# Patient Record
Sex: MALE | Race: WHITE | Employment: FULL TIME
[De-identification: names, ages, dates, MRNs, and addresses within clinical notes are randomized per-mention and may not be internally consistent; named-entity substitution may affect disease eponyms.]

---

## 2017-03-07 ENCOUNTER — OFFICE VISIT (OUTPATIENT)
Dept: FAMILY MEDICINE CLINIC | Facility: CLINIC | Age: 52
End: 2017-03-07

## 2017-03-07 ENCOUNTER — HOSPITAL ENCOUNTER (OUTPATIENT)
Age: 52
Setting detail: SPECIMEN
Discharge: HOME | End: 2017-03-07
Payer: COMMERCIAL

## 2017-03-07 VITALS
WEIGHT: 136 LBS | DIASTOLIC BLOOD PRESSURE: 88 MMHG | SYSTOLIC BLOOD PRESSURE: 140 MMHG | HEIGHT: 70 IN | HEART RATE: 68 BPM | BODY MASS INDEX: 19.47 KG/M2 | OXYGEN SATURATION: 98 %

## 2017-03-07 DIAGNOSIS — I73.9 PAD (PERIPHERAL ARTERY DISEASE) (HCC): ICD-10-CM

## 2017-03-07 DIAGNOSIS — Z12.5 SCREENING PSA (PROSTATE SPECIFIC ANTIGEN): ICD-10-CM

## 2017-03-07 DIAGNOSIS — Z51.81 MEDICATION MONITORING ENCOUNTER: ICD-10-CM

## 2017-03-07 DIAGNOSIS — Z12.5 SCREENING FOR PROSTATE CANCER: ICD-10-CM

## 2017-03-07 DIAGNOSIS — N52.9 ERECTILE DYSFUNCTION, UNSPECIFIED ERECTILE DYSFUNCTION TYPE: ICD-10-CM

## 2017-03-07 DIAGNOSIS — Z13.220 SCREENING FOR LIPID DISORDERS: ICD-10-CM

## 2017-03-07 DIAGNOSIS — Z12.11 COLON CANCER SCREENING: Primary | ICD-10-CM

## 2017-03-07 LAB
ALBUMIN SERPL-MCNC: 4.2 G/DL (ref 3.5–5.2)
ALBUMIN/GLOBULIN RATIO: 1.2 (ref 1–2.5)
ALP BLD-CCNC: 67 U/L (ref 40–129)
ALT SERPL-CCNC: 29 U/L (ref 5–41)
ANION GAP SERPL CALCULATED.3IONS-SCNC: 18 MMOL/L (ref 9–17)
AST SERPL-CCNC: 45 U/L
BILIRUB SERPL-MCNC: 0.5 MG/DL (ref 0.3–1.2)
BUN BLDV-MCNC: 4 MG/DL (ref 6–20)
BUN/CREAT BLD: ABNORMAL (ref 9–20)
CALCIUM SERPL-MCNC: 9.2 MG/DL (ref 8.6–10.4)
CHLORIDE BLD-SCNC: 99 MMOL/L (ref 98–107)
CHOLESTEROL/HDL RATIO: 2.9
CHOLESTEROL: 239 MG/DL
CO2: 23 MMOL/L (ref 20–31)
CREAT SERPL-MCNC: 0.68 MG/DL (ref 0.7–1.2)
GFR AFRICAN AMERICAN: >60 ML/MIN
GFR NON-AFRICAN AMERICAN: >60 ML/MIN
GFR SERPL CREATININE-BSD FRML MDRD: ABNORMAL ML/MIN/{1.73_M2}
GFR SERPL CREATININE-BSD FRML MDRD: ABNORMAL ML/MIN/{1.73_M2}
GLUCOSE BLD-MCNC: 75 MG/DL (ref 70–99)
HDLC SERPL-MCNC: 82 MG/DL
LDL CHOLESTEROL: 131 MG/DL (ref 0–130)
POTASSIUM SERPL-SCNC: 4.5 MMOL/L (ref 3.7–5.3)
PROSTATE SPECIFIC ANTIGEN: 0.27 UG/L
SODIUM BLD-SCNC: 140 MMOL/L (ref 135–144)
TOTAL PROTEIN: 7.7 G/DL (ref 6.4–8.3)
TRIGL SERPL-MCNC: 131 MG/DL
VLDLC SERPL CALC-MCNC: ABNORMAL MG/DL (ref 1–30)

## 2017-03-07 PROCEDURE — 99203 OFFICE O/P NEW LOW 30 MIN: CPT | Performed by: FAMILY MEDICINE

## 2017-03-07 RX ORDER — CILOSTAZOL 100 MG/1
100 TABLET ORAL 2 TIMES DAILY
Qty: 180 TABLET | Refills: 1 | Status: SHIPPED | OUTPATIENT
Start: 2017-03-07 | End: 2017-10-09 | Stop reason: SDUPTHER

## 2017-03-07 RX ORDER — TADALAFIL 20 MG/1
10-20 TABLET ORAL PRN
Qty: 10 TABLET | Refills: 3 | Status: SHIPPED | OUTPATIENT
Start: 2017-03-07 | End: 2017-11-02 | Stop reason: SDUPTHER

## 2017-03-07 ASSESSMENT — ENCOUNTER SYMPTOMS
EYE PAIN: 0
PHOTOPHOBIA: 0
SORE THROAT: 0
CHEST TIGHTNESS: 0
SHORTNESS OF BREATH: 0
CHOKING: 0
APNEA: 0
COLOR CHANGE: 0
BLOOD IN STOOL: 0
COUGH: 0
EYE DISCHARGE: 0
ABDOMINAL PAIN: 0
STRIDOR: 0
WHEEZING: 0
SINUS PRESSURE: 0
RHINORRHEA: 0
EYE ITCHING: 0
CONSTIPATION: 0
BACK PAIN: 0
NAUSEA: 0
EYE REDNESS: 0
ABDOMINAL DISTENTION: 0
VOMITING: 0
DIARRHEA: 0

## 2017-03-07 ASSESSMENT — PATIENT HEALTH QUESTIONNAIRE - PHQ9
1. LITTLE INTEREST OR PLEASURE IN DOING THINGS: 0
SUM OF ALL RESPONSES TO PHQ QUESTIONS 1-9: 0
2. FEELING DOWN, DEPRESSED OR HOPELESS: 0
SUM OF ALL RESPONSES TO PHQ9 QUESTIONS 1 & 2: 0

## 2017-03-13 ENCOUNTER — TELEPHONE (OUTPATIENT)
Dept: FAMILY MEDICINE CLINIC | Age: 52
End: 2017-03-13

## 2017-03-22 ENCOUNTER — HOSPITAL ENCOUNTER (OUTPATIENT)
Dept: VASCULAR LAB | Age: 52
Discharge: HOME | End: 2017-03-22
Payer: COMMERCIAL

## 2017-03-22 DIAGNOSIS — I73.9 CLAUDICATION (HCC): ICD-10-CM

## 2017-03-22 DIAGNOSIS — I73.9 PAD (PERIPHERAL ARTERY DISEASE) (HCC): Primary | ICD-10-CM

## 2017-03-22 PROCEDURE — 93924 LWR XTR VASC STDY BILAT: CPT

## 2017-03-23 DIAGNOSIS — I73.9 PAD (PERIPHERAL ARTERY DISEASE) (HCC): Primary | ICD-10-CM

## 2017-05-31 ENCOUNTER — TELEPHONE (OUTPATIENT)
Dept: FAMILY MEDICINE CLINIC | Age: 52
End: 2017-05-31

## 2017-05-31 DIAGNOSIS — Z51.81 MEDICATION MONITORING ENCOUNTER: Primary | ICD-10-CM

## 2017-05-31 RX ORDER — ATORVASTATIN CALCIUM 40 MG/1
40 TABLET, FILM COATED ORAL DAILY
Qty: 30 TABLET | Refills: 5 | Status: SHIPPED | OUTPATIENT
Start: 2017-05-31 | End: 2017-11-02 | Stop reason: SDUPTHER

## 2017-06-16 ENCOUNTER — HOSPITAL ENCOUNTER (OUTPATIENT)
Age: 52
Setting detail: SPECIMEN
Discharge: HOME | End: 2017-06-16
Payer: COMMERCIAL

## 2017-06-16 DIAGNOSIS — Z51.81 MEDICATION MONITORING ENCOUNTER: ICD-10-CM

## 2017-06-16 LAB
ALBUMIN SERPL-MCNC: 4 G/DL (ref 3.5–5.2)
ALBUMIN/GLOBULIN RATIO: 1.1 (ref 1–2.5)
ALP BLD-CCNC: 65 U/L (ref 40–129)
ALT SERPL-CCNC: 15 U/L (ref 5–41)
ANION GAP SERPL CALCULATED.3IONS-SCNC: 18 MMOL/L (ref 9–17)
AST SERPL-CCNC: 20 U/L
BILIRUB SERPL-MCNC: 0.72 MG/DL (ref 0.3–1.2)
BUN BLDV-MCNC: 4 MG/DL (ref 6–20)
BUN/CREAT BLD: ABNORMAL (ref 9–20)
CALCIUM SERPL-MCNC: 8.9 MG/DL (ref 8.6–10.4)
CHLORIDE BLD-SCNC: 98 MMOL/L (ref 98–107)
CO2: 21 MMOL/L (ref 20–31)
CREAT SERPL-MCNC: 0.72 MG/DL (ref 0.7–1.2)
GFR AFRICAN AMERICAN: >60 ML/MIN
GFR NON-AFRICAN AMERICAN: >60 ML/MIN
GFR SERPL CREATININE-BSD FRML MDRD: ABNORMAL ML/MIN/{1.73_M2}
GFR SERPL CREATININE-BSD FRML MDRD: ABNORMAL ML/MIN/{1.73_M2}
GLUCOSE BLD-MCNC: 81 MG/DL (ref 70–99)
POTASSIUM SERPL-SCNC: 4.6 MMOL/L (ref 3.7–5.3)
SODIUM BLD-SCNC: 137 MMOL/L (ref 135–144)
TOTAL PROTEIN: 7.7 G/DL (ref 6.4–8.3)

## 2017-09-07 ENCOUNTER — OFFICE VISIT (OUTPATIENT)
Dept: FAMILY MEDICINE CLINIC | Age: 52
End: 2017-09-07
Payer: COMMERCIAL

## 2017-09-07 VITALS
DIASTOLIC BLOOD PRESSURE: 80 MMHG | HEIGHT: 70 IN | WEIGHT: 145.4 LBS | SYSTOLIC BLOOD PRESSURE: 160 MMHG | BODY MASS INDEX: 20.81 KG/M2 | HEART RATE: 100 BPM | OXYGEN SATURATION: 97 %

## 2017-09-07 DIAGNOSIS — M54.16 LUMBAR RADICULOPATHY: Primary | ICD-10-CM

## 2017-09-07 PROCEDURE — 99213 OFFICE O/P EST LOW 20 MIN: CPT | Performed by: FAMILY MEDICINE

## 2017-09-07 RX ORDER — PREDNISONE 20 MG/1
TABLET ORAL
Qty: 34 TABLET | Refills: 0 | Status: SHIPPED | OUTPATIENT
Start: 2017-09-07 | End: 2017-10-04 | Stop reason: ALTCHOICE

## 2017-09-07 ASSESSMENT — ENCOUNTER SYMPTOMS
EYE ITCHING: 0
CHEST TIGHTNESS: 0
BLOOD IN STOOL: 0
DIARRHEA: 0
RHINORRHEA: 0
EYE REDNESS: 0
NAUSEA: 0
SORE THROAT: 0
ABDOMINAL PAIN: 0
COUGH: 0
EYE PAIN: 0
VOMITING: 0
EYE DISCHARGE: 0
PHOTOPHOBIA: 0
ABDOMINAL DISTENTION: 0
STRIDOR: 0
WHEEZING: 0
SHORTNESS OF BREATH: 0
COLOR CHANGE: 0
APNEA: 0
SINUS PRESSURE: 0
BACK PAIN: 0
CHOKING: 0
CONSTIPATION: 0

## 2017-09-21 ENCOUNTER — TELEPHONE (OUTPATIENT)
Dept: FAMILY MEDICINE CLINIC | Age: 52
End: 2017-09-21

## 2017-09-21 ENCOUNTER — NURSE ONLY (OUTPATIENT)
Dept: FAMILY MEDICINE CLINIC | Age: 52
End: 2017-09-21

## 2017-09-21 VITALS — DIASTOLIC BLOOD PRESSURE: 86 MMHG | SYSTOLIC BLOOD PRESSURE: 160 MMHG

## 2017-09-21 DIAGNOSIS — R03.0 ELEVATED BLOOD PRESSURE READING: Primary | ICD-10-CM

## 2017-09-21 RX ORDER — HYDROCHLOROTHIAZIDE 25 MG/1
25 TABLET ORAL DAILY
Qty: 30 TABLET | Refills: 5 | Status: SHIPPED | OUTPATIENT
Start: 2017-09-21 | End: 2017-11-02 | Stop reason: SDUPTHER

## 2017-10-04 ENCOUNTER — OFFICE VISIT (OUTPATIENT)
Dept: FAMILY MEDICINE CLINIC | Age: 52
End: 2017-10-04
Payer: COMMERCIAL

## 2017-10-04 VITALS
WEIGHT: 145 LBS | SYSTOLIC BLOOD PRESSURE: 138 MMHG | DIASTOLIC BLOOD PRESSURE: 82 MMHG | BODY MASS INDEX: 20.81 KG/M2 | HEART RATE: 80 BPM

## 2017-10-04 DIAGNOSIS — I73.9 CLAUDICATION (HCC): Primary | ICD-10-CM

## 2017-10-04 DIAGNOSIS — I10 ESSENTIAL HYPERTENSION: ICD-10-CM

## 2017-10-04 PROCEDURE — 99213 OFFICE O/P EST LOW 20 MIN: CPT | Performed by: FAMILY MEDICINE

## 2017-10-04 ASSESSMENT — ENCOUNTER SYMPTOMS
EYE ITCHING: 0
VOMITING: 0
EYE PAIN: 0
NAUSEA: 0
ABDOMINAL DISTENTION: 0
SHORTNESS OF BREATH: 0
STRIDOR: 0
RHINORRHEA: 0
APNEA: 0
CHEST TIGHTNESS: 0
DIARRHEA: 0
SORE THROAT: 0
COLOR CHANGE: 0
ABDOMINAL PAIN: 0
WHEEZING: 0
SINUS PRESSURE: 0
EYE DISCHARGE: 0
COUGH: 0
CONSTIPATION: 0
BLOOD IN STOOL: 0
EYE REDNESS: 0
BACK PAIN: 0
PHOTOPHOBIA: 0
CHOKING: 0

## 2017-10-04 NOTE — MR AVS SNAPSHOT
Additional Information        Basic Information     Date Of Birth Sex Race Ethnicity Preferred Language Preferred Written Language    1965 Male White Non-/Non  English English      Preventive Care        Date Due    Tetanus Combination Vaccine (1 - Tdap) 4/30/1984    Pneumococcal Vaccine - Pneumovax for adults aged 19-64 years with: chronic heart disease, chronic lung disease, diabetes mellitus, alcoholism, chronic liver disease, or cigarette smoking. (1 of 1 - PPSV23) 4/30/1984    Colonoscopy 4/30/2015    Yearly Flu Vaccine (1) 9/1/2017    Hepatitis C screening is recommended for all adults regardless of risk factors born between [facility] at least once (lifetime) who have never been tested. 10/1/2018 (Originally 1965)    HIV screening is recommended for all people regardless of risk factors  aged 15-65 years at least once (lifetime) who have never been HIV tested. 10/1/2018 (Originally 4/30/1980)    Cholesterol Screening 3/7/2022            [facility] Signup           Our records indicate that you have an active eyetok account. You can view your After Visit Summary by going to [email or web address] and logging in with your eyetok username and password. If you don't have a eyetok username and password but a parent or guardian has access to your record, the parent or guardian should login with their own eyetok username and password and access your record to view the After Visit Summary. Additional Information  If you have questions, please contact the physician practice where you receive care. Remember, eyetok is NOT to be used for urgent needs. For medical emergencies, dial 911. For questions regarding your eyetok account call [phone]. If you have a clinical question, please call your doctor's office.

## 2017-10-04 NOTE — PROGRESS NOTES
walking. I would like to trial him in a light compression stocking and see if we can increase his venous return. 2. Essential hypertension  Slightly higher than goal but no additional adjustments seem to be needed.

## 2017-10-10 RX ORDER — CILOSTAZOL 100 MG/1
TABLET ORAL
Qty: 180 TABLET | Refills: 1 | Status: SHIPPED | OUTPATIENT
Start: 2017-10-10 | End: 2017-11-02 | Stop reason: SDUPTHER

## 2017-10-10 NOTE — TELEPHONE ENCOUNTER
Health Maintenance   Topic Date Due    DTaP/Tdap/Td vaccine (1 - Tdap) 04/30/1984    Pneumococcal med risk (1 of 1 - PPSV23) 04/30/1984    Colon cancer screen colonoscopy  04/30/2015    Flu vaccine (1) 09/01/2017    Hepatitis C screen  10/01/2018 (Originally 1965)    HIV screen  10/01/2018 (Originally 4/30/1980)    Lipid screen  03/07/2022       No results found for: LABA1C          ( goal A1C is < 7)   No results found for: LABMICR  LDL Cholesterol (mg/dL)   Date Value   03/07/2017 131 (H)       (goal LDL is <100)   AST (U/L)   Date Value   06/16/2017 20     ALT (U/L)   Date Value   06/16/2017 15     BUN (mg/dL)   Date Value   06/16/2017 4 (L)     BP Readings from Last 3 Encounters:   10/04/17 138/82   09/21/17 (!) 160/86   09/07/17 (!) 160/80          (goal 120/80)    All Future Testing planned in [facility]      Next Visit Date:  No future appointments. There is no problem list on file for this patient.

## 2017-11-02 DIAGNOSIS — Z51.81 MEDICATION MONITORING ENCOUNTER: ICD-10-CM

## 2017-11-02 RX ORDER — ATORVASTATIN CALCIUM 40 MG/1
40 TABLET, FILM COATED ORAL DAILY
Qty: 90 TABLET | Refills: 3 | Status: SHIPPED | OUTPATIENT
Start: 2017-11-02 | End: 2018-11-13 | Stop reason: SDUPTHER

## 2017-11-02 RX ORDER — TADALAFIL 20 MG/1
10-20 TABLET ORAL PRN
Qty: 10 TABLET | Refills: 3 | Status: SHIPPED | OUTPATIENT
Start: 2017-11-02 | End: 2018-08-07 | Stop reason: ALTCHOICE

## 2017-11-02 RX ORDER — HYDROCHLOROTHIAZIDE 25 MG/1
25 TABLET ORAL DAILY
Qty: 90 TABLET | Refills: 3 | Status: SHIPPED | OUTPATIENT
Start: 2017-11-02 | End: 2018-11-13 | Stop reason: SDUPTHER

## 2017-11-02 RX ORDER — CILOSTAZOL 100 MG/1
TABLET ORAL
Qty: 180 TABLET | Refills: 3 | Status: SHIPPED | OUTPATIENT
Start: 2017-11-02 | End: 2018-08-07 | Stop reason: ALTCHOICE

## 2018-08-07 ENCOUNTER — OFFICE VISIT (OUTPATIENT)
Dept: FAMILY MEDICINE CLINIC | Age: 53
End: 2018-08-07
Payer: COMMERCIAL

## 2018-08-07 VITALS
RESPIRATION RATE: 16 BRPM | OXYGEN SATURATION: 97 % | BODY MASS INDEX: 20.04 KG/M2 | SYSTOLIC BLOOD PRESSURE: 128 MMHG | WEIGHT: 140 LBS | TEMPERATURE: 100.2 F | HEART RATE: 101 BPM | DIASTOLIC BLOOD PRESSURE: 70 MMHG | HEIGHT: 70 IN

## 2018-08-07 DIAGNOSIS — J01.90 ACUTE BACTERIAL SINUSITIS: Primary | ICD-10-CM

## 2018-08-07 DIAGNOSIS — B96.89 ACUTE BACTERIAL SINUSITIS: Primary | ICD-10-CM

## 2018-08-07 PROCEDURE — 99213 OFFICE O/P EST LOW 20 MIN: CPT | Performed by: NURSE PRACTITIONER

## 2018-08-07 RX ORDER — AMOXICILLIN AND CLAVULANATE POTASSIUM 875; 125 MG/1; MG/1
1 TABLET, FILM COATED ORAL 2 TIMES DAILY
Qty: 20 TABLET | Refills: 0 | Status: SHIPPED | OUTPATIENT
Start: 2018-08-07 | End: 2018-08-17

## 2018-08-07 ASSESSMENT — PATIENT HEALTH QUESTIONNAIRE - PHQ9
2. FEELING DOWN, DEPRESSED OR HOPELESS: 0
1. LITTLE INTEREST OR PLEASURE IN DOING THINGS: 0
SUM OF ALL RESPONSES TO PHQ QUESTIONS 1-9: 0
SUM OF ALL RESPONSES TO PHQ9 QUESTIONS 1 & 2: 0

## 2018-08-07 ASSESSMENT — ENCOUNTER SYMPTOMS
DIARRHEA: 0
TROUBLE SWALLOWING: 0
SORE THROAT: 0
COLOR CHANGE: 0
SINUS PAIN: 1
SHORTNESS OF BREATH: 0
GASTROINTESTINAL NEGATIVE: 1
EYES NEGATIVE: 1
SINUS PRESSURE: 1
VOMITING: 0
COUGH: 1
WHEEZING: 0

## 2018-08-07 NOTE — PROGRESS NOTES
His speech is normal and behavior is normal. Judgment and thought content normal. Cognition and memory are normal.         Assessment:         1. Acute bacterial sinusitis        Plan:     1. Acute bacterial sinusitis    - amoxicillin-clavulanate (AUGMENTIN) 875-125 MG per tablet; Take 1 tablet by mouth 2 times daily for 10 days  Dispense: 20 tablet; Refill: 0    Antibiotic as prescribed  Tylenol/Advil for fever/pain  Mucinex PRN  Call if worsened or questions/concerns     Discussed use, benefit, and side effects of prescribed medications. All patient questions answered. Pt voiced understanding. Reviewed health maintenance. Instructed to continue current medications, diet and exercise. Patient agreed with treatment plan. Follow up as directed.      Electronically signed by MARC Castano CNP on 8/7/2018

## 2018-08-07 NOTE — PATIENT INSTRUCTIONS
Acute rhinosinusitis (ARS) is defined as symptomatic inflammation of the nasal cavity and paranasal sinuses lasting less than four weeks. The most common etiology of ARS is a viral infection. Treatment for acute viral rhinosinusitis (AVRS) focuses on symptomatic management as it typically resolves within 7 to 10 days. Bacterial infection occurs in only 0.5 to 2 percent of episodes of ARS. Acute bacterial rhinosinusitis (ABRS) may also be a self-limited disease. Patients may be treated symptomatically and observed or treated with antibiotics. ACUTE VIRAL RHINOSINUSITIS -- Patients with acute viral rhinosinusitis (AVRS) should be managed with supportive care. There are no treatments to shorten the clinical course of the disease. Natural history -- AVRS may not completely resolve within 10 days but is expected to improve. Patients who fail to improve after ?10 days of symptomatic management are more likely to have acute bacterial rhinosinusitis (ABRS) and should be managed as ABRS patients.     Treatment includes - Analgesics and antipyretics (tylenol, motrin)  Saline irrigation   Intranasal glucocorticoids (most beneficial for those w/ allergies)  Oral decongestants   Intranasal decongestants   Antihistamines  Mucolytics (mucinex)

## 2018-08-20 ENCOUNTER — TELEPHONE (OUTPATIENT)
Dept: FAMILY MEDICINE CLINIC | Age: 53
End: 2018-08-20

## 2018-08-20 RX ORDER — DOXYCYCLINE 100 MG/1
100 CAPSULE ORAL 2 TIMES DAILY WITH MEALS
Qty: 20 CAPSULE | Refills: 0 | Status: SHIPPED | OUTPATIENT
Start: 2018-08-20 | End: 2018-08-30

## 2018-08-20 NOTE — TELEPHONE ENCOUNTER
Acute respiratory issue     Patient complains of itchy eyes, head congestion, facial pressure  Symptoms for how long  Since last visit here  What meds has pt tried  amoxicillen  Does patient have asthma no  Is patient on inhalers no  Other symptoms include  See above  Is this sinus, cold or cough related  Patient says sinus    *This condition is eligible for an eVisit. If not already active, sign patient up for MyChart to improve access and communication with the provider. *

## 2018-08-20 NOTE — TELEPHONE ENCOUNTER
Pt called in and stated he is finished with his medication but is not any better. Would like to know if something else can be called in or if he needs to be seen.

## 2018-08-20 NOTE — TELEPHONE ENCOUNTER
Are symptoms getting worse better or staying the same? Any facial swelling or fevers still?   I have sent in Doxy to start but please note above when calling back patient

## 2018-08-21 NOTE — TELEPHONE ENCOUNTER
Patient advised. He said the light-headedness is better, but not the headache, or sinus pressure. He does not notice any facial swelling and he has not taken his temp but he doesn't feel like he has a fever. He is at work right now.

## 2018-08-30 ENCOUNTER — TELEPHONE (OUTPATIENT)
Dept: FAMILY MEDICINE CLINIC | Age: 53
End: 2018-08-30

## 2018-08-30 RX ORDER — PREDNISONE 20 MG/1
TABLET ORAL
Qty: 18 TABLET | Refills: 0 | Status: SHIPPED | OUTPATIENT
Start: 2018-08-30 | End: 2018-09-09

## 2018-08-30 NOTE — TELEPHONE ENCOUNTER
Patient was supposed to call and let you know how he is doing. He says nothing has changed. He has  A real bad headache, nause, head congestion,eyes hurt,and scratchy throat. What do you advise?

## 2018-09-12 ENCOUNTER — OFFICE VISIT (OUTPATIENT)
Dept: FAMILY MEDICINE CLINIC | Age: 53
End: 2018-09-12
Payer: COMMERCIAL

## 2018-09-12 VITALS
OXYGEN SATURATION: 95 % | BODY MASS INDEX: 21.06 KG/M2 | WEIGHT: 142.2 LBS | DIASTOLIC BLOOD PRESSURE: 80 MMHG | HEART RATE: 86 BPM | SYSTOLIC BLOOD PRESSURE: 140 MMHG | HEIGHT: 69 IN

## 2018-09-12 DIAGNOSIS — H10.13 ALLERGIC CONJUNCTIVITIS OF BOTH EYES: Primary | ICD-10-CM

## 2018-09-12 DIAGNOSIS — B35.6 JOCK ITCH: ICD-10-CM

## 2018-09-12 PROCEDURE — 99213 OFFICE O/P EST LOW 20 MIN: CPT | Performed by: FAMILY MEDICINE

## 2018-09-12 PROCEDURE — 96372 THER/PROPH/DIAG INJ SC/IM: CPT | Performed by: FAMILY MEDICINE

## 2018-09-12 RX ORDER — OLOPATADINE HYDROCHLORIDE 2 MG/ML
1 SOLUTION/ DROPS OPHTHALMIC DAILY
Qty: 1 BOTTLE | Refills: 0 | Status: SHIPPED | OUTPATIENT
Start: 2018-09-12 | End: 2021-05-03

## 2018-09-12 RX ORDER — TRIAMCINOLONE ACETONIDE 40 MG/ML
120 INJECTION, SUSPENSION INTRA-ARTICULAR; INTRAMUSCULAR ONCE
Status: COMPLETED | OUTPATIENT
Start: 2018-09-12 | End: 2018-09-12

## 2018-09-12 RX ORDER — TERBINAFINE HYDROCHLORIDE 250 MG/1
250 TABLET ORAL DAILY
Qty: 30 TABLET | Refills: 0 | Status: SHIPPED | OUTPATIENT
Start: 2018-09-12 | End: 2018-10-12

## 2018-09-12 RX ADMIN — TRIAMCINOLONE ACETONIDE 120 MG: 40 INJECTION, SUSPENSION INTRA-ARTICULAR; INTRAMUSCULAR at 11:22

## 2018-09-12 ASSESSMENT — ENCOUNTER SYMPTOMS
ABDOMINAL DISTENTION: 0
STRIDOR: 0
SHORTNESS OF BREATH: 0
PHOTOPHOBIA: 0
EYE DISCHARGE: 1
EYE ITCHING: 1
ABDOMINAL PAIN: 0
VOMITING: 0
COLOR CHANGE: 0
NAUSEA: 0
WHEEZING: 0
EYE PAIN: 0
APNEA: 0
CHOKING: 0
RHINORRHEA: 0
CHEST TIGHTNESS: 0
CONSTIPATION: 0
BACK PAIN: 0
SINUS PRESSURE: 1
COUGH: 1
DIARRHEA: 0
SORE THROAT: 0
EYE REDNESS: 0
BLOOD IN STOOL: 0

## 2018-09-12 NOTE — PROGRESS NOTES
Subjective:      Patient ID: Tao Cardneas is a 48 y.o. male. HPI  Has been having about a month of congestion and has been having some trouble with balance and vision for the same period. Vision seems to be more a distance problem with some blurring but reading up close is unaffected and there is no double vision or floater. Eye glasses were updated only about a month and a half ago and prior to being ill he had no difficulty with his vision. No headaches recently but had been when this started. He has been having some matting and crusting on the eyes in the mornings. Review of Systems   Constitutional: Negative for activity change, appetite change, chills, diaphoresis, fatigue, fever and unexpected weight change. HENT: Positive for congestion, postnasal drip and sinus pressure. Negative for dental problem, ear pain, hearing loss, mouth sores, nosebleeds, rhinorrhea and sore throat. Eyes: Positive for discharge, itching and visual disturbance. Negative for photophobia, pain and redness. Respiratory: Positive for cough. Negative for apnea, choking, chest tightness, shortness of breath, wheezing and stridor. Cardiovascular: Negative for chest pain, palpitations and leg swelling. Gastrointestinal: Negative for abdominal distention, abdominal pain, blood in stool, constipation, diarrhea, nausea and vomiting. Genitourinary: Negative for decreased urine volume, difficulty urinating, dysuria, flank pain, frequency, scrotal swelling, testicular pain and urgency. Musculoskeletal: Negative for back pain, gait problem, joint swelling, myalgias, neck pain and neck stiffness. Skin: Positive for rash. Negative for color change and pallor. Neurological: Negative for dizziness, tremors, seizures, syncope, facial asymmetry, speech difficulty, weakness, light-headedness, numbness and headaches.    Psychiatric/Behavioral: Negative for agitation, behavioral problems, decreased concentration, sleep disturbance and suicidal ideas. The patient is not nervous/anxious. Objective:   Physical Exam   Constitutional: He appears well-developed and well-nourished. HENT:   Head: Normocephalic. Right Ear: Tympanic membrane is not erythematous and not bulging. Left Ear: Tympanic membrane is not erythematous and not bulging. Nose: Mucosal edema and rhinorrhea present. Mouth/Throat: Uvula is midline and mucous membranes are normal. Oropharyngeal exudate and posterior oropharyngeal edema present. No posterior oropharyngeal erythema. Eyes: Pupils are equal, round, and reactive to light. EOM are normal. Right conjunctiva is injected. Left conjunctiva is injected. Neck: Trachea normal, normal range of motion and full passive range of motion without pain. Neck supple. No JVD present. Carotid bruit is not present. Cardiovascular: Normal rate, regular rhythm, S1 normal, S2 normal, normal heart sounds and normal pulses. Exam reveals no gallop, no S3, no S4, no distant heart sounds and no friction rub. No murmur heard. No systolic murmur is present   Pulmonary/Chest: Effort normal and breath sounds normal.   Abdominal: Normal appearance and bowel sounds are normal. There is no tenderness. Lymphadenopathy:     He has no cervical adenopathy. Right cervical: No superficial cervical and no deep cervical adenopathy present. Left cervical: No superficial cervical and no deep cervical adenopathy present. Neurological: He is alert. He has normal strength. No cranial nerve deficit or sensory deficit. He displays a negative Romberg sign. Reflex Scores:       Brachioradialis reflexes are 2+ on the right side and 2+ on the left side. Patellar reflexes are 2+ on the right side and 2+ on the left side. Achilles reflexes are 2+ on the right side and 2+ on the left side. Skin: Skin is warm, dry and intact. He is not diaphoretic. No pallor.         Psychiatric: He has a normal mood and affect. His speech is normal and behavior is normal. Judgment and thought content normal. Cognition and memory are normal.       Assessment / Plan:   1. Allergic conjunctivitis of both eyes  Kenalog injection given followed with pataday eye drops. 2. Jock itch  Will treat both orally and topically because of the severity of infection. Will have him then transition to Zeasorb AF powder once cleared.

## 2018-10-25 ENCOUNTER — OFFICE VISIT (OUTPATIENT)
Dept: FAMILY MEDICINE CLINIC | Age: 53
End: 2018-10-25
Payer: COMMERCIAL

## 2018-10-25 VITALS
HEART RATE: 98 BPM | SYSTOLIC BLOOD PRESSURE: 138 MMHG | OXYGEN SATURATION: 96 % | DIASTOLIC BLOOD PRESSURE: 84 MMHG | BODY MASS INDEX: 20.32 KG/M2 | WEIGHT: 137.6 LBS

## 2018-10-25 DIAGNOSIS — H66.92 LEFT OTITIS MEDIA, UNSPECIFIED OTITIS MEDIA TYPE: ICD-10-CM

## 2018-10-25 DIAGNOSIS — J01.91 ACUTE RECURRENT SINUSITIS, UNSPECIFIED LOCATION: Primary | ICD-10-CM

## 2018-10-25 PROCEDURE — 99214 OFFICE O/P EST MOD 30 MIN: CPT | Performed by: NURSE PRACTITIONER

## 2018-10-25 RX ORDER — AMOXICILLIN AND CLAVULANATE POTASSIUM 875; 125 MG/1; MG/1
1 TABLET, FILM COATED ORAL 2 TIMES DAILY
Qty: 20 TABLET | Refills: 0 | Status: SHIPPED | OUTPATIENT
Start: 2018-10-25 | End: 2018-11-04

## 2018-10-25 RX ORDER — FLUTICASONE PROPIONATE 50 MCG
1 SPRAY, SUSPENSION (ML) NASAL DAILY
Qty: 1 BOTTLE | Refills: 3 | Status: SHIPPED | OUTPATIENT
Start: 2018-10-25 | End: 2019-09-03 | Stop reason: SDUPTHER

## 2018-10-25 RX ORDER — PREDNISONE 10 MG/1
TABLET ORAL
Qty: 15 TABLET | Refills: 0 | Status: SHIPPED | OUTPATIENT
Start: 2018-10-25 | End: 2019-12-16 | Stop reason: ALTCHOICE

## 2018-10-25 NOTE — PROGRESS NOTES
CURTIS Melissa    Corinne Lodge is a 48 y.o. male who is here with c/o of:    Chief Complaint: Sinus Problem (itchy eyes and started in Aug); Blurred Vision; Cough; and Headache      Patient Accompanied by: patient    HPI - Corinne Lodge is here w/ c/o:    Respiratory Symptoms:  Patient complains of 2 month(s) history of fever: suspected but not measured, headache, purulent nasal discharge, nasal congestion, post nasal drip, facial pain/sinus pressure: bilateral maxillary and bilateral frontal, non-productive cough, itchy/watery eyes and eye redness. Symptoms have been worsening with time. He denies any other symptoms . Relevant PMH: No pertinent PMH. Smoking history:  He  reports that he has been smoking Cigarettes. He has smoked for the past 30.00 years. He has never used smokeless tobacco. He has had no known ill contacts. Treatment to date: Augmentin, eye drops. He reports that he is having vision changes - he reports that he experiences blurriness that he reports is intermittent. There is no problem list on file for this patient. No past medical history on file. Past Surgical History:   Procedure Laterality Date    CARDIAC SURGERY      had 3 stents put in      No family history on file. Social History   Substance Use Topics    Smoking status: Current Every Day Smoker     Years: 30.00     Types: Cigarettes    Smokeless tobacco: Never Used    Alcohol use Yes     ALLERGIES:  No Known Allergies       Subjective     · Constitutional:  Negative for activity change, appetite change, chills, and unexpected weight change. Positive for fatigue and fever  · HENT: Negative for ear pain,and sore throat. Positive for congestion,  rhinorrhea, sinus pain, sinus pressure and   · Eyes:  Positive for pain and discharge. · Respiratory:  Negative for chest tightness, shortness of breath and wheezing. Positive for cough  · Cardiovascular:  Negative for chest pain, palpitations and leg swelling. · Gastrointestinal: Negative for abdominal pain, blood in stool, constipation,diarrhea, nausea and vomiting. · Endocrine: Negative for cold intolerance, heat intolerance, polydipsia, polyphagia and polyuria. · Genitourinary: Negative for difficulty urinating, dysuria, flank pain, frequency, hematuria and urgency. · Musculoskeletal: Negative for arthralgias, back pain, joint swelling, myalgias, neck pain and neck stiffness. · Skin: Negative for rash and wound. · Allergic/Immunologic: Negative for environmental allergies and food allergies. · Neurological:  Negative for dizziness, light-headedness, numbness and headaches. · Hematological:  Negative for adenopathy. Does not bruise/bleed easily. · Psychiatric/Behavioral: Negative for self-injury, sleep disturbance and suicidal ideas. Objective     PHYSICAL EXAM:   · Constitutional: Zandra Devoid is oriented to person, place, and time. Vital signs are normal. Appears well-developed and well-nourished. · HEENT:   · Head: Normocephalic and atraumatic. Right Ear: Hearing and external ear normal. TM and Canal normal  Left Ear: Hearing and external ear normal. TM bulging, erythema, injected, canal normal  · Nose:  Nares normal. Septum midline. Mucosal erythema. No drainage, no discharge, severe congestion, turbinates red, swollen, grade 4 out of 4, sinus tenderness bilateral  · Mouth/Throat: Oropharynx- erythema, no exudate. Uvula midline, no erythema, no edema. Mucous membranes are pink and moist.   · Eyes:PERRL, EOMI, Conjunctiva normal, No discharge. Dry  · Neck: Trachea normal, full passive range of motion. Non-tender on palpation. Neck supple. No thyromegaly present. · Cardiovascular: Normal rate, regular rhythm, S1, S2, no murmur, no gallop, no friction rub, intact distal pulses. · Pulmonary/Chest: Breath sounds are clear throughout, No respiratory distress, No wheezing, No chest tenderness. Effort normal  · Abdominal: Soft.  Normal appearance, questions. Signed:  Remi Koroma CNP    This note is created with the assistance of a speech-recognition program.  While intending to generate a document that actually reflects the content of the visit, no guarantees can be provided that every mistake has been identified and corrected by editing.

## 2018-10-25 NOTE — PROGRESS NOTES
Visit Information    Have you changed or started any medications since your last visit including any over-the-counter medicines, vitamins, or herbal medicines? no   Are you having any side effects from any of your medications? -  no  Have you stopped taking any of your medications? Is so, why? -  no    Have you seen any other physician or provider since your last visit? No  Have you had any other diagnostic tests since your last visit? No  Have you been seen in the emergency room and/or had an admission to a hospital since we last saw you? No  Have you had your routine dental cleaning in the past 6 months? yes -     Have you activated your [employer] account? If not, what are your barriers?  Yes     Patient Care Team:  Bette Ortega MD as PCP - General (Family Medicine)  Christophe Alonso MD as Consulting Physician (Vascular Surgery)    Medical History Review  Past Medical, Family, and Social History reviewed and does not contribute to the patient presenting condition    Health Maintenance   Topic Date Due    Hepatitis C screen  1965    HIV screen  04/30/1980    Pneumococcal med risk (1 of 1 - PPSV23) 04/30/1984    Shingles Vaccine (1 of 2 - 2 Dose Series) 04/30/2015    Colon cancer screen colonoscopy  04/30/2015    Potassium monitoring  06/16/2018    Creatinine monitoring  06/16/2018    DTaP/Tdap/Td vaccine (1 - Tdap) 01/01/2019 (Originally 4/30/1984)    Flu vaccine (1) 01/01/2019 (Originally 9/1/2018)    Lipid screen  03/07/2022

## 2018-11-13 DIAGNOSIS — Z51.81 MEDICATION MONITORING ENCOUNTER: ICD-10-CM

## 2018-11-13 RX ORDER — ATORVASTATIN CALCIUM 40 MG/1
40 TABLET, FILM COATED ORAL DAILY
Qty: 90 TABLET | Refills: 3 | Status: SHIPPED | OUTPATIENT
Start: 2018-11-13 | End: 2019-12-16 | Stop reason: SDUPTHER

## 2018-11-13 RX ORDER — HYDROCHLOROTHIAZIDE 25 MG/1
25 TABLET ORAL DAILY
Qty: 90 TABLET | Refills: 3 | Status: SHIPPED | OUTPATIENT
Start: 2018-11-13 | End: 2019-12-16 | Stop reason: SDUPTHER

## 2018-11-21 DIAGNOSIS — Z51.81 MEDICATION MONITORING ENCOUNTER: ICD-10-CM

## 2018-11-22 RX ORDER — CILOSTAZOL 100 MG/1
TABLET ORAL
Qty: 180 TABLET | Refills: 3 | Status: SHIPPED | OUTPATIENT
Start: 2018-11-22 | End: 2019-12-16 | Stop reason: SDUPTHER

## 2018-11-22 RX ORDER — ATORVASTATIN CALCIUM 40 MG/1
40 TABLET, FILM COATED ORAL DAILY
Qty: 90 TABLET | Refills: 3 | Status: SHIPPED | OUTPATIENT
Start: 2018-11-22 | End: 2019-12-16 | Stop reason: SDUPTHER

## 2018-11-22 RX ORDER — HYDROCHLOROTHIAZIDE 25 MG/1
25 TABLET ORAL DAILY
Qty: 90 TABLET | Refills: 3 | Status: SHIPPED | OUTPATIENT
Start: 2018-11-22 | End: 2019-12-16 | Stop reason: SDUPTHER

## 2019-09-03 DIAGNOSIS — J01.91 ACUTE RECURRENT SINUSITIS, UNSPECIFIED LOCATION: ICD-10-CM

## 2019-09-03 RX ORDER — FLUTICASONE PROPIONATE 50 MCG
SPRAY, SUSPENSION (ML) NASAL
Qty: 3 BOTTLE | Refills: 3 | Status: SHIPPED | OUTPATIENT
Start: 2019-09-03 | End: 2020-06-24 | Stop reason: ALTCHOICE

## 2019-09-30 ENCOUNTER — TELEPHONE (OUTPATIENT)
Dept: FAMILY MEDICINE CLINIC | Age: 54
End: 2019-09-30

## 2019-12-16 ENCOUNTER — OFFICE VISIT (OUTPATIENT)
Dept: FAMILY MEDICINE CLINIC | Age: 54
End: 2019-12-16
Payer: COMMERCIAL

## 2019-12-16 VITALS
HEIGHT: 70 IN | DIASTOLIC BLOOD PRESSURE: 92 MMHG | WEIGHT: 137.9 LBS | HEART RATE: 77 BPM | SYSTOLIC BLOOD PRESSURE: 183 MMHG | BODY MASS INDEX: 19.74 KG/M2 | TEMPERATURE: 98 F | OXYGEN SATURATION: 86 %

## 2019-12-16 DIAGNOSIS — R19.7 DIARRHEA, UNSPECIFIED TYPE: ICD-10-CM

## 2019-12-16 DIAGNOSIS — R10.13 EPIGASTRIC PAIN: ICD-10-CM

## 2019-12-16 DIAGNOSIS — K21.9 GASTROESOPHAGEAL REFLUX DISEASE WITHOUT ESOPHAGITIS: ICD-10-CM

## 2019-12-16 DIAGNOSIS — Z51.81 MEDICATION MONITORING ENCOUNTER: ICD-10-CM

## 2019-12-16 DIAGNOSIS — I10 HYPERTENSION, UNSPECIFIED TYPE: Primary | ICD-10-CM

## 2019-12-16 PROCEDURE — 99214 OFFICE O/P EST MOD 30 MIN: CPT | Performed by: FAMILY MEDICINE

## 2019-12-16 RX ORDER — BLOOD PRESSURE TEST KIT
1 KIT MISCELLANEOUS 2 TIMES DAILY
Qty: 1 KIT | Refills: 0 | Status: SHIPPED | OUTPATIENT
Start: 2019-12-16 | End: 2021-05-03

## 2019-12-16 RX ORDER — OMEPRAZOLE 20 MG/1
20 CAPSULE, DELAYED RELEASE ORAL DAILY
Qty: 180 CAPSULE | Refills: 1 | Status: SHIPPED | OUTPATIENT
Start: 2019-12-16 | End: 2020-01-13 | Stop reason: DRUGHIGH

## 2019-12-16 RX ORDER — HYDROCHLOROTHIAZIDE 25 MG/1
25 TABLET ORAL DAILY
Qty: 90 TABLET | Refills: 0 | Status: SHIPPED | OUTPATIENT
Start: 2019-12-16 | End: 2020-03-13 | Stop reason: SDUPTHER

## 2019-12-16 RX ORDER — LOPERAMIDE HYDROCHLORIDE 2 MG/1
2 CAPSULE ORAL 4 TIMES DAILY PRN
Qty: 20 CAPSULE | Refills: 0 | Status: SHIPPED | OUTPATIENT
Start: 2019-12-16 | End: 2019-12-26

## 2019-12-16 RX ORDER — ATORVASTATIN CALCIUM 40 MG/1
40 TABLET, FILM COATED ORAL DAILY
Qty: 90 TABLET | Refills: 0 | Status: SHIPPED | OUTPATIENT
Start: 2019-12-16 | End: 2020-04-08 | Stop reason: DRUGHIGH

## 2019-12-16 ASSESSMENT — ENCOUNTER SYMPTOMS
ABDOMINAL PAIN: 1
CONSTIPATION: 0
HEMATOCHEZIA: 0
DIARRHEA: 1
VOMITING: 0
NAUSEA: 1

## 2019-12-17 ASSESSMENT — ENCOUNTER SYMPTOMS
CHEST TIGHTNESS: 0
SHORTNESS OF BREATH: 0
BLOOD IN STOOL: 0

## 2020-01-06 ENCOUNTER — HOSPITAL ENCOUNTER (OUTPATIENT)
Age: 55
Discharge: HOME | End: 2020-01-06
Payer: COMMERCIAL

## 2020-01-06 LAB
ABSOLUTE EOS #: 0.1 K/UL (ref 0–0.4)
ABSOLUTE IMMATURE GRANULOCYTE: ABNORMAL K/UL (ref 0–0.3)
ABSOLUTE LYMPH #: 1.8 K/UL (ref 1–4.8)
ABSOLUTE MONO #: 1.1 K/UL (ref 0.1–1.3)
ALBUMIN SERPL-MCNC: 3.4 G/DL (ref 3.5–5.2)
ALBUMIN/GLOBULIN RATIO: ABNORMAL (ref 1–2.5)
ALP BLD-CCNC: 75 U/L (ref 40–129)
ALT SERPL-CCNC: 71 U/L (ref 5–41)
ANION GAP SERPL CALCULATED.3IONS-SCNC: 17 MMOL/L (ref 9–17)
AST SERPL-CCNC: 35 U/L
BASOPHILS # BLD: 1 % (ref 0–2)
BASOPHILS ABSOLUTE: 0 K/UL (ref 0–0.2)
BILIRUB SERPL-MCNC: 1.03 MG/DL (ref 0.3–1.2)
BUN BLDV-MCNC: 7 MG/DL (ref 6–20)
BUN/CREAT BLD: ABNORMAL (ref 9–20)
CALCIUM SERPL-MCNC: 9.1 MG/DL (ref 8.6–10.4)
CHLORIDE BLD-SCNC: 84 MMOL/L (ref 98–107)
CO2: 29 MMOL/L (ref 20–31)
CREAT SERPL-MCNC: 0.68 MG/DL (ref 0.7–1.2)
DIFFERENTIAL TYPE: ABNORMAL
EOSINOPHILS RELATIVE PERCENT: 1 % (ref 0–4)
GFR AFRICAN AMERICAN: >60 ML/MIN
GFR NON-AFRICAN AMERICAN: >60 ML/MIN
GFR SERPL CREATININE-BSD FRML MDRD: ABNORMAL ML/MIN/{1.73_M2}
GFR SERPL CREATININE-BSD FRML MDRD: ABNORMAL ML/MIN/{1.73_M2}
GLUCOSE BLD-MCNC: 103 MG/DL (ref 70–99)
HCT VFR BLD CALC: 49 % (ref 41–53)
HEMOGLOBIN: 17.2 G/DL (ref 13.5–17.5)
IMMATURE GRANULOCYTES: ABNORMAL %
LIPASE: 15 U/L (ref 13–60)
LYMPHOCYTES # BLD: 22 % (ref 24–44)
MCH RBC QN AUTO: 37.1 PG (ref 26–34)
MCHC RBC AUTO-ENTMCNC: 35.1 G/DL (ref 31–37)
MCV RBC AUTO: 105.6 FL (ref 80–100)
MONOCYTES # BLD: 14 % (ref 1–7)
NRBC AUTOMATED: ABNORMAL PER 100 WBC
PDW BLD-RTO: 13.3 % (ref 11.5–14.9)
PLATELET # BLD: 265 K/UL (ref 150–450)
PLATELET ESTIMATE: ABNORMAL
PMV BLD AUTO: 7.6 FL (ref 6–12)
POTASSIUM SERPL-SCNC: 3.5 MMOL/L (ref 3.7–5.3)
RBC # BLD: 4.64 M/UL (ref 4.5–5.9)
RBC # BLD: ABNORMAL 10*6/UL
SEG NEUTROPHILS: 62 % (ref 36–66)
SEGMENTED NEUTROPHILS ABSOLUTE COUNT: 5.3 K/UL (ref 1.3–9.1)
SODIUM BLD-SCNC: 130 MMOL/L (ref 135–144)
TOTAL PROTEIN: 7.1 G/DL (ref 6.4–8.3)
WBC # BLD: 8.3 K/UL (ref 3.5–11)
WBC # BLD: ABNORMAL 10*3/UL

## 2020-01-06 PROCEDURE — 80053 COMPREHEN METABOLIC PANEL: CPT

## 2020-01-06 PROCEDURE — 85025 COMPLETE CBC W/AUTO DIFF WBC: CPT

## 2020-01-06 PROCEDURE — 36415 COLL VENOUS BLD VENIPUNCTURE: CPT

## 2020-01-06 PROCEDURE — 83690 ASSAY OF LIPASE: CPT

## 2020-01-13 ENCOUNTER — OFFICE VISIT (OUTPATIENT)
Dept: INTERNAL MEDICINE CLINIC | Age: 55
End: 2020-01-13
Payer: COMMERCIAL

## 2020-01-13 VITALS
DIASTOLIC BLOOD PRESSURE: 86 MMHG | HEART RATE: 96 BPM | HEIGHT: 70 IN | OXYGEN SATURATION: 99 % | BODY MASS INDEX: 18.47 KG/M2 | WEIGHT: 129 LBS | SYSTOLIC BLOOD PRESSURE: 138 MMHG

## 2020-01-13 PROBLEM — Z98.890 HISTORY OF ENDARTERECTOMY: Status: ACTIVE | Noted: 2020-01-13

## 2020-01-13 PROBLEM — I73.9 PERIPHERAL VASCULAR DISEASE (HCC): Status: ACTIVE | Noted: 2017-06-26

## 2020-01-13 PROBLEM — E87.6 HYPOKALEMIA: Status: ACTIVE | Noted: 2018-07-03

## 2020-01-13 PROBLEM — E78.5 HYPERLIPIDEMIA LDL GOAL <70: Status: ACTIVE | Noted: 2020-01-13

## 2020-01-13 PROBLEM — I10 HYPERTENSION: Status: ACTIVE | Noted: 2018-06-18

## 2020-01-13 PROCEDURE — 99204 OFFICE O/P NEW MOD 45 MIN: CPT | Performed by: PHYSICIAN ASSISTANT

## 2020-01-13 PROCEDURE — 99406 BEHAV CHNG SMOKING 3-10 MIN: CPT | Performed by: PHYSICIAN ASSISTANT

## 2020-01-13 RX ORDER — ONDANSETRON 4 MG/1
4 TABLET, FILM COATED ORAL 3 TIMES DAILY PRN
Qty: 15 TABLET | Refills: 0 | Status: SHIPPED | OUTPATIENT
Start: 2020-01-13 | End: 2020-04-08

## 2020-01-13 RX ORDER — OMEPRAZOLE 40 MG/1
40 CAPSULE, DELAYED RELEASE ORAL DAILY
Qty: 30 CAPSULE | Refills: 2 | Status: SHIPPED | OUTPATIENT
Start: 2020-01-13 | End: 2020-02-07

## 2020-01-13 RX ORDER — OMEPRAZOLE 40 MG/1
40 CAPSULE, DELAYED RELEASE ORAL DAILY
Qty: 30 CAPSULE | Refills: 2 | Status: SHIPPED | OUTPATIENT
Start: 2020-01-13 | End: 2020-01-13 | Stop reason: SDUPTHER

## 2020-01-13 ASSESSMENT — PATIENT HEALTH QUESTIONNAIRE - PHQ9
SUM OF ALL RESPONSES TO PHQ QUESTIONS 1-9: 0
1. LITTLE INTEREST OR PLEASURE IN DOING THINGS: 0
SUM OF ALL RESPONSES TO PHQ QUESTIONS 1-9: 0
2. FEELING DOWN, DEPRESSED OR HOPELESS: 0
SUM OF ALL RESPONSES TO PHQ9 QUESTIONS 1 & 2: 0

## 2020-01-13 NOTE — PROGRESS NOTES
[facility]    [facility] Note/History and Physical    Date of patient's visit: 1/14/2020    Name:  Kevin Reyes      YOB: 1965    Patient Care Team:  Marlon Gonzales PA-C as PCP - General (Physician Assistant)  Tyrone Mazariegos MD as Consulting Physician (Vascular Surgery)    REASON FOR VISIT:   Establish care. HISTORY OF PRESENTING ILLNESS:    History was obtained from the patient. Kevin Reyes is a 47 y.o. male here to establish care. Patient reports past medical history as below. Abdominal pain. Symptoms present for the past 1-2 months. Pain described as an ache (2-10/10) central abdomen without radiation. Associated with nausea, emesis has resolved. Associated diarrhea which comes and goes. No blood per rectum or dark stools. Pain is worse with food itake, no significantly alleviating factors. No NSAID use. No known history of gastritis or pud. Colonoscopy 2017, history of tubular adenoma. Peripheral vascular disease. Follows with vascular surgeon at [facility]. Status post left femoral endarterectomy with bovine patch angioplasty and recanalization of left iliac artery. Takes Lipitor 40 mg QHS, ASA 81 mg QD. Denies claudication. No wounds to lower extremities. Hypertension, hyperlipidemia. Well controlled, on medication. He denies recent chest pain or dyspnea on exertion. Had stress test 06/2018 with no evidence for ischemia. Tobacco use, current smoker 1.0 ppd x 30+ years. PAST MEDICAL HISTORY:    No past medical history on file.     PAST SURGICAL HISTORY:          Procedure Laterality Date    CARDIAC SURGERY      had 3 stents put in      ALLERGIES:    No Known Allergies      MEDICATION:      Current Outpatient Medications on File Prior to Visit   Medication Sig Dispense Refill    atorvastatin (LIPITOR) 40 MG tablet Take 1 tablet by mouth daily 90 tablet 0    hydrochlorothiazide (HYDRODIURIL) 25 MG tablet Take 1 tablet by mouth daily 90 tablet 0    Blood Pressure KIT 1 kit by Does not apply route 2 times daily 1 kit 0    fluticasone (FLONASE) 50 MCG/ACT nasal spray SPRAY 1 SPRAY INTO EACH NOSTRIL EVERY DAY 3 Bottle 3    olopatadine (PATADAY) 0.2 % SOLN ophthalmic solution Place 1 drop into both eyes daily 1 Bottle 0    BABY ASPIRIN PO Take 81 mg by mouth       No current facility-administered medications on file prior to visit. FAMILY HISTORY:    No family history on file. SOCIAL HISTORY:      Social History     Socioeconomic History    Marital status: Single     Spouse name: None    Number of children: None    Years of education: None    Highest education level: None   Occupational History    None   Social Needs    Financial resource strain: None    Food insecurity:     Worry: None     Inability: None    Transportation needs:     Medical: None     Non-medical: None   Tobacco Use    Smoking status: Current Every Day Smoker     Packs/day: 1.00     Years: 30.00     Pack years: 30.00     Types: Cigarettes    Smokeless tobacco: Never Used    Tobacco comment: former cigarette use, now smokes cigars    Substance and Sexual Activity    Alcohol use:  Yes    Drug use: No    Sexual activity: None   Lifestyle    Physical activity:     Days per week: None     Minutes per session: None    Stress: None   Relationships    Social connections:     Talks on phone: None     Gets together: None     Attends [employer] service: None     Active member of club or organization: None     Attends meetings of clubs or organizations: None     Relationship status: None    Intimate partner violence:     Fear of current or ex partner: None     Emotionally abused: None     Physically abused: None     Forced sexual activity: None   Other Topics Concern    None   Social History Narrative    None       REVIEW OF SYSTEMS:   Review of Systems   Constitutional: Positive for fatigue and unexpected weight change (25 lbs over past 1-2 months). Negative for appetite change, chills, diaphoresis and fever. HENT: Negative for congestion, dental problem, ear discharge, ear pain, hearing loss, postnasal drip, rhinorrhea, sinus pain, sore throat, trouble swallowing and voice change. Eyes: Negative for photophobia, pain, discharge, redness, itching and visual disturbance. Respiratory: Negative for cough, choking, chest tightness, shortness of breath and wheezing. Cardiovascular: Negative for chest pain, palpitations and leg swelling. Gastrointestinal: Positive for abdominal pain, diarrhea and nausea. Negative for abdominal distention, blood in stool, constipation, rectal pain and vomiting. Endocrine: Negative for cold intolerance, heat intolerance, polydipsia, polyphagia and polyuria. Genitourinary: Negative for difficulty urinating, dysuria, flank pain, frequency, hematuria, scrotal swelling, testicular pain and urgency. Musculoskeletal: Negative for arthralgias, back pain, gait problem, joint swelling, neck pain and neck stiffness. Skin: Negative for color change, pallor and rash. Multiple cysts, has had removed in past   Allergic/Immunologic: Negative for immunocompromised state. Neurological: Negative for dizziness, tremors, seizures, syncope, facial asymmetry, speech difficulty, weakness, light-headedness, numbness and headaches. Hematological: Negative for adenopathy. Does not bruise/bleed easily. Psychiatric/Behavioral: Negative for dysphoric mood and sleep disturbance. The patient is not nervous/anxious.         PHYSICAL EXAM:      Vitals:    01/13/20 1446   BP: 138/86   Pulse: 96   SpO2: 99%   Weight: 129 lb (58.5 kg)   Height: 5' 10\" (1.778 m)     General - alert, well appearing, and in no distress  Skin - normal coloration and turgor, no rash  Eyes - pupils equal and reactive, extraocular eye movements intact  Ears - bilateral TM's and external ear canals normal  Nose - normal and patent, no erythema, discharge or polyps  Mouth - mucous membranes moist, pharynx normal without lesions  Neck - supple, no significant adenopathy, palpable mass present to left side of neck submandibular, mass is firm, mobile, non tender, no carotid bruit bilaterally   Lymphatics - no palpable lymphadenopathy, no hepatosplenomegaly  Chest - clear to auscultation, no wheezes, rales or rhonchi, symmetric air entry  Heart - normal rate, regular rhythm, no murmurs, rubs, clicks or gallops  Abdomen - inspection unremarkable, normoactive bowel sounds x 4, abdomen is non distended, soft on palpation, no localized tenderness, no guarding or rigidity, no rebound tenderness, no palpable masses or organomegaly appreciated   Back - full range of motion, no tenderness, palpable spasm or pain on motion  Neurological -alert, oriented, normal speech, no focal sensory or motor deficit, cranial nerve exam without deficit  Musculoskeletal - no joint tenderness, deformity or swelling, strength 5/5 in upper and lower extremities   Extremities - peripheral pulses normal, no pedal edema    LABORATORY FINDINGS:    CBC:   Lab Results   Component Value Date    WBC 8.3 01/06/2020    HGB 17.2 01/06/2020     01/06/2020     BMP:    Lab Results   Component Value Date     01/06/2020    K 3.5 01/06/2020    CL 84 01/06/2020    CO2 29 01/06/2020    BUN 7 01/06/2020    CREATININE 0.68 01/06/2020    GLUCOSE 103 01/06/2020     Hemoglobin A1C: No results found for: LABA1C  Lipid profile:   Lab Results   Component Value Date    CHOL 239 03/07/2017    TRIG 131 03/07/2017    HDL 82 03/07/2017     Thyroid functions: No results found for: TSH   Hepatic functions:   Lab Results   Component Value Date    ALT 71 01/06/2020    AST 35 01/06/2020    PROT 7.1 01/06/2020    BILITOT 1.03 01/06/2020    LABALBU 3.4 01/06/2020     ASSESSMENT AND PLAN:     1. Encounter to establish care  - Lipid, Fasting; Future  - CBC Auto Differential; Future  - Lipid Panel;  Future  - Psa screening; Future  - TSH without Reflex; Future  - Urinalysis; Future  - Vitamin D 25 Hydroxy; Future    2. Essential hypertension  - Well controlled  - Continue present management  -- Basic Metabolic Panel; Future    3. Hyperlipidemia LDL goal <70  - Continue Lipitor 40 mg QHS, ASA 81 mg QD   - Lipid Panel; Future    4. Peripheral vascular disease ([facility] [address]  5. History of endarterectomy  - Follows with vascular surgeon, follow up as instructed   - Continue Lipitor 40 mg QHS, ASA 81 mg QD     6. Generalized abdominal pain  7. Non-intractable vomiting with nausea, unspecified vomiting type  - Will treat symptoms, imaging given associated weight loss, gastro consult for upper/lower endoscopy   - CT ABDOMEN PELVIS W WO CONTRAST Additional Contrast? Oral; Future  - Hepatic Function Panel; Future  - Pietro Alas MD, Gastroenterology, [place]  - omeprazole ([address]) 40 MG delayed release capsule; Take 1 capsule by mouth Daily  Dispense: 30 capsule; Refill: 2  - ondansetron (ZOFRAN) 4 MG tablet; Take 1 tablet by mouth 3 times daily as needed for Nausea or Vomiting  Dispense: 15 tablet; Refill: 0    8. Unintentional weight loss  - Rule out neoplasm, will get imaging chest/abdomen/pelvis, age appropriate cancer screenings  -- TSH without Reflex; Future  -- CT ABDOMEN PELVIS W WO CONTRAST Additional Contrast? Oral; Future  -- CT CHEST WO CONTRAST; Future    9. Colon cancer screening  - Pietro Alas MD, Gastroenterology, [place]    10. Prostate cancer screening  - Psa screening; Future    11. Tobacco use  - Greater than 5 minutes spent discussing smoking cessation  - Discussed importance of cessation, risks of continued use  - Discussed previous attempts to quit, methods and skills for cessation, medication management  - Continue to assess at each visit  - CT CHEST [address]; Future    INSTRUCTIONS:   Return in about 2 weeks (around 1/27/2020), earlier if needed.     Stef Jenkins received counseling onthe following

## 2020-01-14 ASSESSMENT — ENCOUNTER SYMPTOMS
EYE DISCHARGE: 0
DIARRHEA: 1
NAUSEA: 1
SINUS PAIN: 0
CONSTIPATION: 0
COLOR CHANGE: 0
EYE ITCHING: 0
BLOOD IN STOOL: 0
BACK PAIN: 0
WHEEZING: 0
ABDOMINAL DISTENTION: 0
CHEST TIGHTNESS: 0
RHINORRHEA: 0
COUGH: 0
CHOKING: 0
EYE REDNESS: 0
VOICE CHANGE: 0
SHORTNESS OF BREATH: 0
VOMITING: 0
RECTAL PAIN: 0
SORE THROAT: 0
PHOTOPHOBIA: 0
EYE PAIN: 0
ABDOMINAL PAIN: 1
TROUBLE SWALLOWING: 0

## 2020-01-17 ENCOUNTER — TELEPHONE (OUTPATIENT)
Dept: INTERNAL MEDICINE CLINIC | Age: 55
End: 2020-01-17

## 2020-02-07 RX ORDER — OMEPRAZOLE 40 MG/1
CAPSULE, DELAYED RELEASE ORAL
Qty: 30 CAPSULE | Refills: 11 | Status: SHIPPED | OUTPATIENT
Start: 2020-02-07 | End: 2020-04-08

## 2020-02-10 RX ORDER — THIAMINE MONONITRATE (VIT B1) 100 MG
TABLET ORAL
Qty: 30 TABLET | Refills: 0 | Status: SHIPPED | OUTPATIENT
Start: 2020-02-10 | End: 2020-03-05

## 2020-02-18 ENCOUNTER — TELEPHONE (OUTPATIENT)
Dept: GASTROENTEROLOGY | Age: 55
End: 2020-02-18

## 2020-03-13 RX ORDER — HYDROCHLOROTHIAZIDE 25 MG/1
25 TABLET ORAL DAILY
Qty: 90 TABLET | Refills: 0 | Status: SHIPPED | OUTPATIENT
Start: 2020-03-13 | End: 2020-06-10

## 2020-04-03 ENCOUNTER — HOSPITAL ENCOUNTER (OUTPATIENT)
Age: 55
Setting detail: SPECIMEN
Discharge: HOME | End: 2020-04-03
Payer: COMMERCIAL

## 2020-04-03 LAB
ABSOLUTE EOS #: 0.09 K/UL (ref 0–0.44)
ABSOLUTE IMMATURE GRANULOCYTE: 0.03 K/UL (ref 0–0.3)
ABSOLUTE LYMPH #: 2.64 K/UL (ref 1.1–3.7)
ABSOLUTE MONO #: 0.76 K/UL (ref 0.1–1.2)
ALBUMIN SERPL-MCNC: 4.2 G/DL (ref 3.5–5.2)
ALBUMIN/GLOBULIN RATIO: 1.1 (ref 1–2.5)
ALP BLD-CCNC: 70 U/L (ref 40–129)
ALT SERPL-CCNC: 16 U/L (ref 5–41)
ANION GAP SERPL CALCULATED.3IONS-SCNC: 18 MMOL/L (ref 9–17)
AST SERPL-CCNC: 19 U/L
BASOPHILS # BLD: 1 % (ref 0–2)
BASOPHILS ABSOLUTE: 0.08 K/UL (ref 0–0.2)
BILIRUB SERPL-MCNC: 0.79 MG/DL (ref 0.3–1.2)
BILIRUBIN DIRECT: 0.19 MG/DL
BILIRUBIN URINE: NEGATIVE
BILIRUBIN, INDIRECT: 0.6 MG/DL (ref 0–1)
BUN BLDV-MCNC: 3 MG/DL (ref 6–20)
BUN/CREAT BLD: ABNORMAL (ref 9–20)
CALCIUM SERPL-MCNC: 9.8 MG/DL (ref 8.6–10.4)
CHLORIDE BLD-SCNC: 92 MMOL/L (ref 98–107)
CHOLESTEROL, FASTING: 155 MG/DL
CHOLESTEROL/HDL RATIO: 2.9
CO2: 25 MMOL/L (ref 20–31)
COLOR: YELLOW
COMMENT UA: NORMAL
CREAT SERPL-MCNC: 0.67 MG/DL (ref 0.7–1.2)
DIFFERENTIAL TYPE: ABNORMAL
EOSINOPHILS RELATIVE PERCENT: 1 % (ref 1–4)
GFR AFRICAN AMERICAN: >60 ML/MIN
GFR NON-AFRICAN AMERICAN: >60 ML/MIN
GFR SERPL CREATININE-BSD FRML MDRD: ABNORMAL ML/MIN/{1.73_M2}
GFR SERPL CREATININE-BSD FRML MDRD: ABNORMAL ML/MIN/{1.73_M2}
GLOBULIN: NORMAL G/DL (ref 1.5–3.8)
GLUCOSE BLD-MCNC: 81 MG/DL (ref 70–99)
GLUCOSE URINE: NEGATIVE
HCT VFR BLD CALC: 51.1 % (ref 40.7–50.3)
HDLC SERPL-MCNC: 53 MG/DL
HEMOGLOBIN: 17.5 G/DL (ref 13–17)
IMMATURE GRANULOCYTES: 0 %
KETONES, URINE: NEGATIVE
LDL CHOLESTEROL: 80 MG/DL (ref 0–130)
LEUKOCYTE ESTERASE, URINE: NEGATIVE
LYMPHOCYTES # BLD: 34 % (ref 24–43)
MCH RBC QN AUTO: 34.7 PG (ref 25.2–33.5)
MCHC RBC AUTO-ENTMCNC: 34.2 G/DL (ref 28.4–34.8)
MCV RBC AUTO: 101.2 FL (ref 82.6–102.9)
MONOCYTES # BLD: 10 % (ref 3–12)
NITRITE, URINE: NEGATIVE
NRBC AUTOMATED: 0 PER 100 WBC
PDW BLD-RTO: 14.5 % (ref 11.8–14.4)
PH UA: 6.5 (ref 5–8)
PLATELET # BLD: 252 K/UL (ref 138–453)
PLATELET ESTIMATE: ABNORMAL
PMV BLD AUTO: 10 FL (ref 8.1–13.5)
POTASSIUM SERPL-SCNC: 4 MMOL/L (ref 3.7–5.3)
PROSTATE SPECIFIC ANTIGEN: 0.42 UG/L
PROTEIN UA: NEGATIVE
RBC # BLD: 5.05 M/UL (ref 4.21–5.77)
RBC # BLD: ABNORMAL 10*6/UL
SEG NEUTROPHILS: 54 % (ref 36–65)
SEGMENTED NEUTROPHILS ABSOLUTE COUNT: 4.12 K/UL (ref 1.5–8.1)
SODIUM BLD-SCNC: 135 MMOL/L (ref 135–144)
SPECIFIC GRAVITY UA: 1.01 (ref 1–1.03)
TOTAL PROTEIN: 7.9 G/DL (ref 6.4–8.3)
TRIGLYCERIDE, FASTING: 110 MG/DL
TSH SERPL DL<=0.05 MIU/L-ACNC: 1.51 MIU/L (ref 0.3–5)
TURBIDITY: CLEAR
URINE HGB: NEGATIVE
UROBILINOGEN, URINE: NORMAL
VITAMIN D 25-HYDROXY: 10.6 NG/ML (ref 30–100)
VLDLC SERPL CALC-MCNC: NORMAL MG/DL (ref 1–30)
WBC # BLD: 7.7 K/UL (ref 3.5–11.3)
WBC # BLD: ABNORMAL 10*3/UL

## 2020-04-07 RX ORDER — ERGOCALCIFEROL 1.25 MG/1
50000 CAPSULE ORAL WEEKLY
Qty: 12 CAPSULE | Refills: 0 | Status: SHIPPED | OUTPATIENT
Start: 2020-04-07 | End: 2020-10-09 | Stop reason: SDUPTHER

## 2020-04-08 ENCOUNTER — OFFICE VISIT (OUTPATIENT)
Dept: INTERNAL MEDICINE CLINIC | Age: 55
End: 2020-04-08
Payer: COMMERCIAL

## 2020-04-08 VITALS
BODY MASS INDEX: 20.47 KG/M2 | SYSTOLIC BLOOD PRESSURE: 132 MMHG | TEMPERATURE: 98.1 F | OXYGEN SATURATION: 97 % | DIASTOLIC BLOOD PRESSURE: 80 MMHG | HEART RATE: 102 BPM | WEIGHT: 143 LBS | HEIGHT: 70 IN

## 2020-04-08 PROCEDURE — 99214 OFFICE O/P EST MOD 30 MIN: CPT | Performed by: PHYSICIAN ASSISTANT

## 2020-04-08 RX ORDER — CILOSTAZOL 100 MG/1
100 TABLET ORAL DAILY
Qty: 60 TABLET | Refills: 3 | COMMUNITY
Start: 2020-04-08 | End: 2020-06-24

## 2020-04-08 RX ORDER — ATORVASTATIN CALCIUM 80 MG/1
80 TABLET, FILM COATED ORAL DAILY
Qty: 30 TABLET | Refills: 3 | Status: SHIPPED | OUTPATIENT
Start: 2020-04-08

## 2020-04-08 RX ORDER — CLOPIDOGREL BISULFATE 75 MG/1
75 TABLET ORAL DAILY
COMMUNITY
End: 2021-02-03 | Stop reason: SDUPTHER

## 2020-04-08 NOTE — PROGRESS NOTES
 cilostazol (PLETAL) 100 MG tablet Take 1 tablet by mouth daily 60 tablet 3    vitamin D (ERGOCALCIFEROL) 1.25 MG (36354 UT) CAPS capsule Take 1 capsule by mouth once a week 12 capsule 0    hydroCHLOROthiazide (HYDRODIURIL) 25 MG tablet Take 1 tablet by mouth daily 90 tablet 0    vitamin B-1 (THIAMINE) 100 MG tablet TAKE 1 TABLET BY MOUTH EVERY DAY 30 tablet 5    Blood Pressure KIT 1 kit by Does not apply route 2 times daily 1 kit 0    fluticasone (FLONASE) 50 MCG/ACT nasal spray SPRAY 1 SPRAY INTO EACH NOSTRIL EVERY DAY 3 Bottle 3    olopatadine (PATADAY) 0.2 % SOLN ophthalmic solution Place 1 drop into both eyes daily 1 Bottle 0    BABY ASPIRIN PO Take 81 mg by mouth       No current facility-administered medications for this visit. ALLERGIES:    No Known Allergies      SOCIAL HISTORY    Reviewed and no change from previous record. Rosario Gusman  reports that he has been smoking cigarettes. He has a 30.00 pack-year smoking history. He has never used smokeless tobacco.    FAMILY HISTORY:    Reviewed and No change from previous visit    REVIEW OF SYSTEMS:    Review of Systems   Constitutional: Positive for appetite change (improved) and unexpected weight change (gaining weight). Negative for chills, diaphoresis, fatigue and fever. HENT: Negative for congestion, ear discharge, ear pain, hearing loss, rhinorrhea, sinus pain, sore throat, trouble swallowing and voice change. Eyes: Negative for pain, discharge, itching and visual disturbance. Respiratory: Negative for cough, chest tightness and shortness of breath. Cardiovascular: Negative for chest pain, palpitations and leg swelling. Gastrointestinal: Negative for abdominal pain (resolved), blood in stool, constipation, diarrhea, nausea and vomiting. Endocrine: Negative for cold intolerance and heat intolerance. Genitourinary: Negative for difficulty urinating, dysuria, flank pain, frequency, hematuria and urgency.    Musculoskeletal: Negative

## 2020-04-09 ASSESSMENT — ENCOUNTER SYMPTOMS
VOMITING: 0
COLOR CHANGE: 0
CONSTIPATION: 0
EYE PAIN: 0
SHORTNESS OF BREATH: 0
BLOOD IN STOOL: 0
CHEST TIGHTNESS: 0
NAUSEA: 0
SINUS PAIN: 0
VOICE CHANGE: 0
TROUBLE SWALLOWING: 0
DIARRHEA: 0
ABDOMINAL PAIN: 0
EYE ITCHING: 0
SORE THROAT: 0
RHINORRHEA: 0
COUGH: 0
EYE DISCHARGE: 0
BACK PAIN: 0

## 2020-05-11 ENCOUNTER — TELEPHONE (OUTPATIENT)
Dept: INTERNAL MEDICINE CLINIC | Age: 55
End: 2020-05-11

## 2020-05-11 NOTE — TELEPHONE ENCOUNTER
Pt's wife called stating she was trying to schedule the CT A/P that was ordered. Scheduling will not schedule it because they need to know if it is with or without contrast. Will a new order need to be put in? Please advise.

## 2020-05-12 NOTE — TELEPHONE ENCOUNTER
Obtained reports from [facility]. Nathaly Speak reviewed. Spoke to pt's wife, advised US still needs to be done. Scheduled at [address] [place] [address] 5/27.

## 2020-05-27 ENCOUNTER — HOSPITAL ENCOUNTER (OUTPATIENT)
Dept: ULTRASOUND IMAGING | Age: 55
Discharge: HOME | End: 2020-05-29
Payer: COMMERCIAL

## 2020-05-27 PROCEDURE — 76536 US EXAM OF HEAD AND NECK: CPT

## 2020-06-10 RX ORDER — HYDROCHLOROTHIAZIDE 25 MG/1
TABLET ORAL
Qty: 90 TABLET | Refills: 0 | Status: SHIPPED | OUTPATIENT
Start: 2020-06-10 | End: 2020-09-03

## 2020-06-24 ENCOUNTER — HOSPITAL ENCOUNTER (OUTPATIENT)
Age: 55
Setting detail: SPECIMEN
Discharge: HOME | End: 2020-06-24
Payer: COMMERCIAL

## 2020-06-24 ENCOUNTER — OFFICE VISIT (OUTPATIENT)
Dept: PRIMARY CARE CLINIC | Age: 55
End: 2020-06-24
Payer: COMMERCIAL

## 2020-06-24 ENCOUNTER — NURSE TRIAGE (OUTPATIENT)
Dept: OTHER | Facility: CLINIC | Age: 55
End: 2020-06-24

## 2020-06-24 ENCOUNTER — HOSPITAL ENCOUNTER (OUTPATIENT)
Dept: GENERAL RADIOLOGY | Age: 55
Discharge: HOME | End: 2020-06-26
Payer: COMMERCIAL

## 2020-06-24 ENCOUNTER — HOSPITAL ENCOUNTER (OUTPATIENT)
Age: 55
Discharge: HOME | End: 2020-06-26
Payer: COMMERCIAL

## 2020-06-24 VITALS
DIASTOLIC BLOOD PRESSURE: 90 MMHG | OXYGEN SATURATION: 99 % | BODY MASS INDEX: 20.48 KG/M2 | TEMPERATURE: 99.2 F | HEIGHT: 70 IN | WEIGHT: 143.08 LBS | SYSTOLIC BLOOD PRESSURE: 140 MMHG | RESPIRATION RATE: 16 BRPM | HEART RATE: 82 BPM

## 2020-06-24 PROBLEM — R06.02 SOB (SHORTNESS OF BREATH) ON EXERTION: Status: ACTIVE | Noted: 2018-06-18

## 2020-06-24 PROBLEM — I70.219 ATHEROSCLEROSIS OF NATIVE ARTERY OF EXTREMITY WITH INTERMITTENT CLAUDICATION (HCC): Status: ACTIVE | Noted: 2018-07-03

## 2020-06-24 PROBLEM — K55.1 CHRONIC MESENTERIC ISCHEMIA (HCC): Status: ACTIVE | Noted: 2020-01-29

## 2020-06-24 PROCEDURE — 71046 X-RAY EXAM CHEST 2 VIEWS: CPT

## 2020-06-24 PROCEDURE — 99214 OFFICE O/P EST MOD 30 MIN: CPT | Performed by: NURSE PRACTITIONER

## 2020-06-24 RX ORDER — BENZONATATE 100 MG/1
100 CAPSULE ORAL 3 TIMES DAILY PRN
Qty: 30 CAPSULE | Refills: 0 | Status: SHIPPED | OUTPATIENT
Start: 2020-06-24 | End: 2020-07-04

## 2020-06-24 RX ORDER — ATORVASTATIN CALCIUM 80 MG/1
TABLET, FILM COATED ORAL
COMMUNITY
Start: 2020-06-02 | End: 2020-07-08 | Stop reason: SDUPTHER

## 2020-06-24 RX ORDER — DOXYCYCLINE HYCLATE 100 MG
100 TABLET ORAL 2 TIMES DAILY
Qty: 20 TABLET | Refills: 0 | Status: SHIPPED | OUTPATIENT
Start: 2020-06-24 | End: 2020-07-08 | Stop reason: ALTCHOICE

## 2020-06-24 RX ORDER — HYDROCHLOROTHIAZIDE 25 MG/1
TABLET ORAL
COMMUNITY
Start: 2020-06-10 | End: 2020-07-08 | Stop reason: SDUPTHER

## 2020-06-24 RX ORDER — FLUTICASONE PROPIONATE 50 MCG
SPRAY, SUSPENSION (ML) NASAL
COMMUNITY
Start: 2020-05-31

## 2020-06-24 RX ORDER — CLOPIDOGREL BISULFATE 75 MG/1
TABLET ORAL
COMMUNITY
Start: 2020-06-03 | End: 2020-06-24

## 2020-06-24 RX ORDER — OMEPRAZOLE 20 MG/1
CAPSULE, DELAYED RELEASE ORAL
COMMUNITY
Start: 2020-06-09 | End: 2021-05-03

## 2020-06-24 ASSESSMENT — ENCOUNTER SYMPTOMS
EYE PAIN: 0
WHEEZING: 1
EYE REDNESS: 1
CHEST TIGHTNESS: 0
RHINORRHEA: 0
ABDOMINAL DISTENTION: 0
SHORTNESS OF BREATH: 0
COUGH: 1
DIARRHEA: 1
SORE THROAT: 0
VOMITING: 1
NAUSEA: 0
TROUBLE SWALLOWING: 0

## 2020-06-24 NOTE — PATIENT INSTRUCTIONS
help with muscle aches. Read and follow all instructions on the label. · Sponge your body with lukewarm water to help with fever. Don't use cold water or ice. · Use petroleum jelly on sore skin. This can help if the skin around your nose and lips becomes sore from rubbing a lot with tissues. Tips for isolation  · Wear a cloth face cover when you are around other people. It can help stop the spread of the virus when you cough or sneeze. · Limit contact with people in your home. If possible, stay in a separate bedroom and use a separate bathroom. · If you have to leave home, avoid crowds and try to stay at least 6 feet away from other people. · Avoid contact with pets and other animals. · Cover your mouth and nose with a tissue when you cough or sneeze. Then throw it in the trash right away. · Wash your hands often, especially after you cough or sneeze. Use soap and water, and scrub for at least 20 seconds. If soap and water aren't available, use an alcohol-based hand . · Don't share personal household items. These include bedding, towels, cups and glasses, and eating utensils. · [address] in the warmest water allowed for the fabric type, and dry it completely. It's okay to wash other people's laundry with yours. · Clean and disinfect your home every day. Use household  and disinfectant wipes or sprays. Take special care to clean things that you grab with your hands. These include doorknobs, remote controls, phones, and handles on your refrigerator and microwave. And don't forget countertops, tabletops, bathrooms, and computer keyboards. When should you call for help? [id number] anytime you think you may need emergency care. For example, call if you have life-threatening symptoms, such as:  · You have severe trouble breathing. (You can't talk at all.)  · You have constant chest pain or pressure. · You are severely dizzy or lightheaded. · You are confused or can't think clearly.   · Your

## 2020-06-24 NOTE — TELEPHONE ENCOUNTER
Reason for Disposition   Coughing up alan-colored (reddish-brown) or blood-tinged sputum    Answer Assessment - Initial Assessment Questions  1. ONSET: \"When did the cough begin? \"       Symptoms started about 2-3 days ago. Mucus with blood started yesterday  2. SEVERITY: \"How bad is the cough today? \"     Cough is not all the time, will cough mucus up each time  3. RESPIRATORY DISTRESS: \"Describe your breathing. \"      No difficulty breathing  4. FEVER: \"Do you have a fever? \" If so, ask: \"What is your temperature, how was it measured, and when did it start? \"    No fever  5. HEMOPTYSIS: \"Are you coughing up any blood? \" If so ask: \"How much? \" (flecks, streaks, tablespoons, etc.)    Blood mixed with phlegm  6. TREATMENT: \"What have you done so far to treat the cough? \" (e.g., meds, fluids, humidifier)     No meds  7. CARDIAC HISTORY: \"Do you have any history of heart disease? \" (e.g., heart attack, congestive heart failure)   Cardiac history-stents in legs  8. LUNG HISTORY: \"Do you have any history of lung disease? \"  (e.g., pulmonary embolus, asthma, emphysema)      no  9. PE RISK FACTORS: \"Do you have a history of blood clots? \" (or: recent major surgery, recent prolonged travel, bedridden)  no  10. OTHER SYMPTOMS: \"Do you have any other symptoms? (e.g., runny nose, wheezing, chest pain)  Sinus symptoms  11. PREGNANCY: \"Is there any chance you are pregnant? \" \"When was your last menstrual period? \"  na  12. TRAVEL: \"Have you traveled out of the country in the last month? \" (e.g., travel history, exposures)    Protocols used: COUGH-ADULT-[place]    Received call from [facility]. Pt calling with cough for 2-3 days. coughing up a blood tinged mucous. No breathing difficulty. No fever. Recommend pt is seen today, call back if any new or worsening symptoms. Call soft transferred to [address] to schedule appointment. Please do not reply to the triage nurse through this encounter.   Any subsequent

## 2020-06-24 NOTE — PROGRESS NOTES
vomiting (dry heaves). Negative for abdominal distention and nausea. Genitourinary: Negative for decreased urine volume and difficulty urinating. Musculoskeletal: Positive for arthralgias and myalgias. Negative for gait problem and neck pain. Skin: Negative for pallor and rash. Neurological: Positive for headaches. Negative for weakness and light-headedness. Psychiatric/Behavioral: Negative for sleep disturbance. Objective:     Physical Exam  Vitals signs and nursing note reviewed. Constitutional:       General: He is not in acute distress. Appearance: Normal appearance. HENT:      Head: Normocephalic and atraumatic. Jaw: No trismus. Right Ear: Tympanic membrane and ear canal normal.      Left Ear: Tympanic membrane and ear canal normal.      Nose: Congestion present. Mouth/Throat:      Lips: Pink. Mouth: Mucous membranes are moist.      Pharynx: Oropharynx is clear. Uvula midline. Posterior oropharyngeal erythema present. No oropharyngeal exudate. Eyes:      General: Lids are normal.      Extraocular Movements: Extraocular movements intact. Conjunctiva/sclera:      Right eye: Right conjunctiva is injected. No exudate. Left eye: Left conjunctiva is injected. No exudate. Pupils: Pupils are equal, round, and reactive to light. Neck:      Musculoskeletal: Normal range of motion and neck supple. Cardiovascular:      Rate and Rhythm: Normal rate and regular rhythm. Pulses: Normal pulses. Pulmonary:      Effort: Pulmonary effort is normal. No tachypnea. Breath sounds: Examination of the right-middle field reveals decreased breath sounds and rales. Examination of the left-middle field reveals decreased breath sounds. Examination of the right-lower field reveals decreased breath sounds and rales. Decreased breath sounds, wheezing and rales present. Abdominal:      General: Bowel sounds are normal. There is no distension.       Palpations: Abdomen is mmol/L    Potassium 4.0 3.7 - 5.3 mmol/L    Chloride 92 (L) 98 - 107 mmol/L    CO2 25 20 - 31 mmol/L    Anion Gap 18 (H) 9 - 17 mmol/L    GFR Non-African American >60 >60 mL/min    GFR African American >60 >60 mL/min    GFR Comment          GFR Staging NOT REPORTED    Hepatic Function Panel   Result Value Ref Range    Alb 4.2 3.5 - 5.2 g/dL    Alkaline Phosphatase 70 40 - 129 U/L    ALT 16 5 - 41 U/L    AST 19 <40 U/L    Total Bilirubin 0.79 0.3 - 1.2 mg/dL    Bilirubin, Direct 0.19 <0.31 mg/dL    Bilirubin, Indirect 0.60 0.00 - 1.00 mg/dL    Total Protein 7.9 6.4 - 8.3 g/dL    Globulin NOT REPORTED 1.5 - 3.8 g/dL    Albumin/Globulin Ratio 1.1 1.0 - 2.5   Lipid, Fasting   Result Value Ref Range    Cholesterol, Fasting 155 <200 mg/dL    HDL 53 >40 mg/dL    LDL Cholesterol 80 0 - 130 mg/dL    Chol/HDL Ratio 2.9 <5    Triglyceride, Fasting 110 <150 mg/dL    VLDL NOT REPORTED 1 - 30 mg/dL   CBC Auto Differential   Result Value Ref Range    WBC 7.7 3.5 - 11.3 k/uL    RBC 5.05 4.21 - 5.77 m/uL    Hemoglobin 17.5 (H) 13.0 - 17.0 g/dL    Hematocrit 51.1 (H) 40.7 - 50.3 %    .2 82.6 - 102.9 fL    MCH 34.7 (H) 25.2 - 33.5 pg    MCHC 34.2 28.4 - 34.8 g/dL    RDW 14.5 (H) 11.8 - 14.4 %    Platelets 508 800 - 310 k/uL    MPV 10.0 8.1 - 13.5 fL    NRBC Automated 0.0 0.0 per 100 WBC    Differential Type NOT REPORTED     Seg Neutrophils 54 36 - 65 %    Lymphocytes 34 24 - 43 %    Monocytes 10 3 - 12 %    Eosinophils % 1 1 - 4 %    Basophils 1 0 - 2 %    Immature Granulocytes 0 0 %    Segs Absolute 4.12 1.50 - 8.10 k/uL    Absolute Lymph # 2.64 1.10 - 3.70 k/uL    Absolute Mono # 0.76 0.10 - 1.20 k/uL    Absolute Eos # 0.09 0.00 - 0.44 k/uL    Basophils Absolute 0.08 0.00 - 0.20 k/uL    Absolute Immature Granulocyte 0.03 0.00 - 0.30 k/uL    WBC Morphology NOT REPORTED     RBC Morphology ANISOCYTOSIS PRESENT     Platelet Estimate NOT REPORTED    Psa screening   Result Value Ref Range    PSA 0.42 <4.1 ug/L   TSH without Reflex

## 2020-06-27 LAB — SARS-COV-2, NAA: NOT DETECTED

## 2020-07-08 ENCOUNTER — OFFICE VISIT (OUTPATIENT)
Dept: INTERNAL MEDICINE CLINIC | Age: 55
End: 2020-07-08
Payer: COMMERCIAL

## 2020-07-08 VITALS
HEIGHT: 70 IN | BODY MASS INDEX: 19.41 KG/M2 | TEMPERATURE: 99.1 F | HEART RATE: 96 BPM | OXYGEN SATURATION: 98 % | DIASTOLIC BLOOD PRESSURE: 82 MMHG | SYSTOLIC BLOOD PRESSURE: 130 MMHG | WEIGHT: 135.6 LBS

## 2020-07-08 PROBLEM — Z92.89 HISTORY OF CARDIOVASCULAR STRESS TEST: Status: ACTIVE | Noted: 2020-07-08

## 2020-07-08 PROCEDURE — 90732 PPSV23 VACC 2 YRS+ SUBQ/IM: CPT | Performed by: PHYSICIAN ASSISTANT

## 2020-07-08 PROCEDURE — 90471 IMMUNIZATION ADMIN: CPT | Performed by: PHYSICIAN ASSISTANT

## 2020-07-08 PROCEDURE — 99214 OFFICE O/P EST MOD 30 MIN: CPT | Performed by: PHYSICIAN ASSISTANT

## 2020-07-08 NOTE — PROGRESS NOTES
[address] [address]  Dept: [phone]  Dept Fax: [phone]    OfficeProgress/Follow Up Note  Date of patient's visit: 7/9/2020  Patient's Name:  Tani Richardson YOB: 1965            Patient Care Team:  Soham Hubbard PA-C as PCP - General (Physician Assistant)  Soham Hubbard PA-C as PCP - [facility]  Srikanth Parker MD as Consulting Physician (Vascular Surgery)    REASON FOR VISIT:  Routine outpatient follow up    HISTORY OF PRESENT ILLNESS:      Chief Complaint   Patient presents with    3 Month Follow-Up     States have been good    Health Maintenance     pneumonia, Dtap    Other     Had pneumonia about 2 weeks ago, COVID test was Neg, had CXR 6/24, still gets some phlegm in the mornings     History was obtained from the patient. Tani Richardson is a 54 y.o. male here today for follow up on chronic medical conditions. Patient complains of recent cough, congestion. Was seen at walk-in clinic, Covid-19 negative, chest xray revealed patchy infiltrate in the peripheral mid right lung field suggestive of developing infection. Status post Doxycyline with improvement in symptoms. No cough, chest pain, dyspnea, fever/chills. Patient with history of mesenteric artery stenosis, status post stent placement, following with vascular, on Lipitor 80 mg QD, ASA/Plavix. Follows with [facility] vascular, carotid US 2/9/20  plaque without significant stenosis (<50%) of the internal carotid artery. Had nuclear stress test 6/8/18 with no scintigraphic evidence for inducible ischemia, low risk study. No recent chest pain/pressure, no dyspnea on exertion, no claudication. Left sided neck mass, ultrasound revealed complex hypoechoic/cystic mass, was referred to ENT but has not followed up.      Patient Active Problem List   Diagnosis    Hypertension    Peripheral vascular disease ([address]    Hypokalemia    History of endarterectomy    Hyperlipidemia LDL goal <70    Atherosclerosis of native artery of extremity with intermittent claudication (HCC)    Chronic mesenteric ischemia (HCC)    SOB (shortness of breath) on exertion    History of cardiovascular stress test     Health Maintenance Due   Topic Date Due    Hepatitis C screen  1965    HIV screen  04/30/1980    DTaP/Tdap/Td vaccine (1 - Tdap) 04/30/1984    Shingles Vaccine (1 of 2) 04/30/2015     MEDICATIONS:      Current Outpatient Medications   Medication Sig Dispense Refill    fluticasone (FLONASE) 50 MCG/ACT nasal spray       hydroCHLOROthiazide (HYDRODIURIL) 25 MG tablet TAKE 1 TABLET BY MOUTH EVERY DAY 90 tablet 0    clopidogrel (PLAVIX) 75 MG tablet Take 75 mg by mouth daily      atorvastatin (LIPITOR) 80 MG tablet Take 1 tablet by mouth daily 30 tablet 3    vitamin D (ERGOCALCIFEROL) 1.25 MG (11474 UT) CAPS capsule Take 1 capsule by mouth once a week 12 capsule 0    vitamin B-1 (THIAMINE) 100 MG tablet TAKE 1 TABLET BY MOUTH EVERY DAY 30 tablet 5    Blood Pressure KIT 1 kit by Does not apply route 2 times daily 1 kit 0    olopatadine (PATADAY) 0.2 % SOLN ophthalmic solution Place 1 drop into both eyes daily 1 Bottle 0    BABY ASPIRIN PO Take 81 mg by mouth      omeprazole (PRILOSEC) 20 MG delayed release capsule       vitamin D (CHOLECALCIFEROL) 47626 UNIT CAPS        No current facility-administered medications for this visit. ALLERGIES:    No Known Allergies      SOCIAL HISTORY    Reviewed and no change from previous record. Cinthia Kanner  reports that he has been smoking cigarettes. He has a 30.00 pack-year smoking history. He has never used smokeless tobacco.    FAMILY HISTORY:    Reviewed and no change from previous visit    REVIEW OF SYSTEMS:    Review of Systems   Constitutional: Positive for appetite change (improved). Negative for chills, diaphoresis, fatigue, fever and unexpected weight change. HENT: Positive for postnasal drip. Negative for congestion, ear discharge, ear pain, hearing loss, rhinorrhea, sinus pain, sore throat, trouble swallowing and voice change. Eyes: Negative for pain, discharge, itching and visual disturbance. Respiratory: Negative for cough, chest tightness and shortness of breath. Cardiovascular: Negative for chest pain, palpitations and leg swelling. Gastrointestinal: Negative for abdominal pain, blood in stool, constipation, diarrhea, nausea and vomiting. Endocrine: Negative for cold intolerance and heat intolerance. Genitourinary: Negative for difficulty urinating, dysuria, flank pain, frequency, hematuria and urgency. Musculoskeletal: Negative for arthralgias, back pain, neck pain and neck stiffness. Skin: Negative for color change, pallor and rash. Neurological: Negative for dizziness, weakness, light-headedness, numbness and headaches. Hematological: Negative for adenopathy. Does not bruise/bleed easily. Psychiatric/Behavioral: Negative for dysphoric mood. The patient is not nervous/anxious.       PHYSICAL EXAM:      Vitals:    07/08/20 1343   BP: 130/82   Pulse: 96   Temp: 99.1 °F (37.3 °C)   SpO2: 98%   Weight: 135 lb 9.6 oz (61.5 kg)   Height: 5' 10\" (1.778 m)     BP Readings from Last 3 Encounters:   07/08/20 130/82   06/24/20 (!) 140/90   04/08/20 132/80      Wt Readings from Last 3 Encounters:   07/08/20 135 lb 9.6 oz (61.5 kg)   06/24/20 143 lb 1.3 oz (64.9 kg)   04/08/20 143 lb (64.9 kg)     General - alert, well appearing, and in no distress  Skin - normal coloration and turgor, no rash  Eyes - pupils equal and reactive, extraocular eye movements intact  Mouth - mucous membranes are moist, pharynx normal without lesions  Neck - supple, no significant adenopathy, left neck palpable mass unchanged   Lymphatics - no palpable lymphadenopathy, no hepatosplenomegaly  Chest - diminished air entry bilaterally, no wheezes, rales or rhonchi, symmetric air entry, no hypoxia or respiratory distress   Heart - normal rate, rhythm is regular, no murmur  Abdomen - soft, nontender, nondistended, no masses or organomegaly  Back - full range of motion, no tenderness, palpable spasm or pain on motion  Neurological - alert, oriented, normal speech, no focal sensory or motor deficit  Extremities - peripheral pulses normal, no pedal edema    LABORATORY FINDINGS:    CBC:  Lab Results   Component Value Date    WBC 7.7 04/03/2020    HGB 17.5 04/03/2020     04/03/2020     BMP:    Lab Results   Component Value Date     04/03/2020    K 4.0 04/03/2020    CL 92 04/03/2020    CO2 25 04/03/2020    BUN 3 04/03/2020    CREATININE 0.67 04/03/2020    GLUCOSE 81 04/03/2020     HEMOGLOBIN A1C: No results found for: LABA1C    FASTING LIPID PANEL:  Lab Results   Component Value Date    CHOL 239 (H) 03/07/2017    HDL 53 04/03/2020    TRIG 131 03/07/2017     ASSESSMENT AND PLAN:      1. Essential hypertension  - Controlled, continue present management    2. Pneumonia of both lungs due to infectious organism, unspecified part of lung  - Status post antibiotics, clinically resolved    3. Chronic mesenteric ischemia (HCC)  - Stable, following with vascular, continue present management    4. Peripheral vascular disease (HCC)  - Stable, continue Lipitor 80, ASA 81, Plavix 75, smoking cessation     5. Hyperlipidemia LDL goal <70  - Controlled, continue present management    6. Tobacco use  - Continued to emphasize importance of cessation, risks of continued use, advised to cut back use     7. Need for vaccination  - PNEUMOVAX 23 subcutaneous/IM (Pneumococcal polysaccharide vaccine 23-valent >= 3yo)    FOLLOW UP AND INSTRUCTIONS:   Return in about 3 months (around 10/8/2020). Cinthia Kanner received counseling on the following healthy behaviors: nutrition, exercise, medication adherence and tobacco cessation    Discussed use, benefit, and side effects of prescribed medications. Barriers to medication compliance addressed.   All patient questions answered. Patient voiced understanding. Patient given educational materials - see patient instructions    Reggie HEBERT [facility]  7/9/2020, 11:07 AM    Please note that this chart was generated using voice recognition Dragon dictation software. Although everyeffort was made to ensure the accuracy of this automated transcription, some errors in transcription may have occurred.

## 2020-07-09 ASSESSMENT — ENCOUNTER SYMPTOMS
CONSTIPATION: 0
DIARRHEA: 0
VOICE CHANGE: 0
TROUBLE SWALLOWING: 0
SHORTNESS OF BREATH: 0
RHINORRHEA: 0
COLOR CHANGE: 0
BLOOD IN STOOL: 0
EYE PAIN: 0
COUGH: 0
CHEST TIGHTNESS: 0
EYE DISCHARGE: 0
BACK PAIN: 0
ABDOMINAL PAIN: 0
SINUS PAIN: 0
VOMITING: 0
EYE ITCHING: 0
SORE THROAT: 0
NAUSEA: 0

## 2020-09-03 RX ORDER — HYDROCHLOROTHIAZIDE 25 MG/1
TABLET ORAL
Qty: 90 TABLET | Refills: 0 | Status: SHIPPED | OUTPATIENT
Start: 2020-09-03 | End: 2020-12-23 | Stop reason: SDUPTHER

## 2020-09-14 RX ORDER — THIAMINE MONONITRATE (VIT B1) 100 MG
TABLET ORAL
Qty: 30 TABLET | Refills: 5 | Status: SHIPPED | OUTPATIENT
Start: 2020-09-14 | End: 2021-02-03

## 2020-10-09 ENCOUNTER — OFFICE VISIT (OUTPATIENT)
Dept: INTERNAL MEDICINE CLINIC | Age: 55
End: 2020-10-09
Payer: COMMERCIAL

## 2020-10-09 VITALS
BODY MASS INDEX: 20.33 KG/M2 | RESPIRATION RATE: 16 BRPM | HEART RATE: 84 BPM | WEIGHT: 142 LBS | DIASTOLIC BLOOD PRESSURE: 80 MMHG | SYSTOLIC BLOOD PRESSURE: 124 MMHG | TEMPERATURE: 98.3 F | HEIGHT: 70 IN

## 2020-10-09 PROCEDURE — 99214 OFFICE O/P EST MOD 30 MIN: CPT | Performed by: PHYSICIAN ASSISTANT

## 2020-10-09 ASSESSMENT — ENCOUNTER SYMPTOMS
SORE THROAT: 0
DIARRHEA: 0
SINUS PAIN: 0
CHEST TIGHTNESS: 0
BLOOD IN STOOL: 0
EYE DISCHARGE: 0
RHINORRHEA: 0
VOMITING: 0
WHEEZING: 0
NAUSEA: 0
COUGH: 0
EYE PAIN: 0
SHORTNESS OF BREATH: 0
TROUBLE SWALLOWING: 0
COLOR CHANGE: 0
EYE ITCHING: 0
CONSTIPATION: 0
BACK PAIN: 0
ABDOMINAL PAIN: 0

## 2020-10-09 ASSESSMENT — PATIENT HEALTH QUESTIONNAIRE - PHQ9
SUM OF ALL RESPONSES TO PHQ9 QUESTIONS 1 & 2: 0
SUM OF ALL RESPONSES TO PHQ QUESTIONS 1-9: 0
2. FEELING DOWN, DEPRESSED OR HOPELESS: 0
1. LITTLE INTEREST OR PLEASURE IN DOING THINGS: 0
SUM OF ALL RESPONSES TO PHQ QUESTIONS 1-9: 0

## 2020-10-09 NOTE — PROGRESS NOTES
Visit Information    Have you changed or started any medications since your last visit including any over-the-counter medicines, vitamins, or herbal medicines? no   Are you having any side effects from any of your medications? -  no  Have you stopped taking any of your medications? Is so, why? -  no    Have you seen any other physician or provider since your last visit? No  Have you had any other diagnostic tests since your last visit? No  Have you been seen in the emergency room and/or had an admission to a hospital since we last saw you? No  Have you had your routine dental cleaning in the past 6 months? yes -    Have you activated your [employer] account? If not, what are your barriers?  Yes     Patient Care Team:  Marvel Hall PA-C as PCP - General (Physician Assistant)  Marvel Hall PA-C as PCP - [facility]  Karin Thibodeaux MD as Consulting Physician (Vascular Surgery)    Medical History Review  Past Medical, Family, and Social History reviewed and does contribute to the patient presenting condition    Health Maintenance   Topic Date Due    Hepatitis C screen  1965    HIV screen  04/30/1980    DTaP/Tdap/Td vaccine (1 - Tdap) 04/30/1984    Shingles Vaccine (1 of 2) 04/30/2015    Flu vaccine (1) 09/01/2020    Low dose CT lung screening  01/14/2021    Lipid screen  04/03/2021    Potassium monitoring  04/03/2021    Creatinine monitoring  04/03/2021    Colon cancer screen colonoscopy  08/18/2022    Pneumococcal 0-64 years Vaccine  Completed    Hepatitis A vaccine  Aged Out    Hepatitis B vaccine  Aged Out    Hib vaccine  Aged Out    Meningococcal (ACWY) vaccine  Aged Out     Chief Complaint   Patient presents with    Follow-up

## 2020-10-09 NOTE — PROGRESS NOTES
[address] [place] [id number]  Dept: [phone]  Dept Fax: [phone]    OfficeProgress/Follow Up Note  Date of patient's visit: 10/9/2020  Patient's Name:  Kristine Husain YOB: 1965            Patient Care Team:  Chapincito Silvestre PA-C as PCP - General (Physician Assistant)  Chapincito Silvestre PA-C as PCP - [facility] Provider  Santiago Gruber MD as Consulting Physician (Vascular Surgery)    REASON FOR VISIT:  Routine outpatient follow up    HISTORY OF PRESENT ILLNESS:      Chief Complaint   Patient presents with    Follow-up     History was obtained from the patient. Kristine Husain is a 54 y.o. male here today for evaluation of multiple chronic medical conditions. Peripheral artery disease, mesenteric ischemia. Status post stent placement, abdominal pain has resolved, appetite improving, gaining weight. He is compliant with Lipitor 80, ASA 81, Plavix 75. No signs of bleeding, does report easily bruising. Left sided neck mass, ultrasound revealed complex hypoechoic/cystic mass, was referred to ENT but has not followed up. He reports no change in size, no redness, warmth, drainage. No other lumps/bumps noted.      Patient Active Problem List   Diagnosis    Hypertension    Peripheral vascular disease ([address]    Hypokalemia    History of endarterectomy    Hyperlipidemia LDL goal <70    Atherosclerosis of native artery of extremity with intermittent claudication (HCC)    Chronic mesenteric ischemia (HCC)    SOB (shortness of breath) on exertion    History of cardiovascular stress test     Health Maintenance Due   Topic Date Due    Hepatitis C screen  1965    HIV screen  04/30/1980    DTaP/Tdap/Td vaccine (1 - Tdap) 04/30/1984    Shingles Vaccine (1 of 2) 04/30/2015    Flu vaccine (1) 09/01/2020     MEDICATIONS:      Current Outpatient Medications   Medication Sig Dispense Refill    vitamin B-1 (THIAMINE) 100 MG tablet TAKE 1 TABLET BY MOUTH EVERY DAY 30 tablet 5    hydroCHLOROthiazide (HYDRODIURIL) 25 MG tablet TAKE 1 TABLET BY MOUTH EVERY DAY 90 tablet 0    vitamin D (CHOLECALCIFEROL) 86080 UNIT CAPS       fluticasone (FLONASE) 50 MCG/ACT nasal spray       clopidogrel (PLAVIX) 75 MG tablet Take 75 mg by mouth daily      atorvastatin (LIPITOR) 80 MG tablet Take 1 tablet by mouth daily 30 tablet 3    Blood Pressure KIT 1 kit by Does not apply route 2 times daily 1 kit 0    olopatadine (PATADAY) 0.2 % SOLN ophthalmic solution Place 1 drop into both eyes daily 1 Bottle 0    BABY ASPIRIN PO Take 81 mg by mouth      omeprazole (PRILOSEC) 20 MG delayed release capsule        No current facility-administered medications for this visit. ALLERGIES:    No Known Allergies      SOCIAL HISTORY    Reviewed and no change from previous record. Vinnie Humphries  reports that he has been smoking cigarettes. He has a 30.00 pack-year smoking history. He has never used smokeless tobacco.    FAMILY HISTORY:    Reviewed and no change from previous visit    REVIEW OF SYSTEMS:    Review of Systems   Constitutional: Positive for appetite change (improved) and unexpected weight change (gain). Negative for chills, diaphoresis, fatigue and fever. HENT: Negative for congestion, ear discharge, ear pain, hearing loss, rhinorrhea, sinus pain, sore throat and trouble swallowing. Eyes: Negative for pain, discharge, itching and visual disturbance. Respiratory: Negative for cough, chest tightness, shortness of breath and wheezing. Cardiovascular: Negative for chest pain, palpitations and leg swelling. Gastrointestinal: Negative for abdominal pain, blood in stool, constipation, diarrhea, nausea and vomiting. Endocrine: Negative for cold intolerance and heat intolerance. Genitourinary: Negative for difficulty urinating, dysuria, flank pain, frequency, hematuria and urgency.    Musculoskeletal: Negative for arthralgias, back pain, neck pain and neck 04/03/2020     HEMOGLOBIN A1C: No results found for: LABA1C    FASTING LIPID PANEL:  Lab Results   Component Value Date    CHOL 239 (H) 03/07/2017    HDL 53 04/03/2020    TRIG 131 03/07/2017     ASSESSMENT AND PLAN:      1. Essential hypertension  - Controlled, continue present management    2. Peripheral vascular disease (HCC)  - Controlled, Lipitor 80, ASA 81, Plavix 75, advised to quit smoking     3. Chronic mesenteric ischemia (HCC)  - Controlled, Lipitor 80, ASA 81, Plavix 75, advised to quit smoking   - Continue to follow with vascular surgeon as instructed     4. Extremity atherosclerosis with intermittent claudication (HCC)  - Resolved, status post stent placement left lower extremity    5. Tobacco use  - Advised to quit smoking, has cut back use smoking cigars on occasion     6. Need for vaccination    FOLLOW UP AND INSTRUCTIONS:   Return in about 3 months (around 1/9/2021). Abigail Carvalho received counseling on the following healthy behaviors: nutrition, exercise, medication adherence and tobacco cessation    Discussed use, benefit, and side effects of prescribed medications. Barriers to medication compliance addressed. All patient questions answered. Patient voiced understanding. Patient given educational materials - see patient instructions    Adela HEBERT [facility]  10/9/2020, 4:04 PM    Please note that this chart was generated using voice recognition Dragon dictation software. Although everyeffort was made to ensure the accuracy of this automated transcription, some errors in transcription may have occurred.

## 2020-12-23 RX ORDER — HYDROCHLOROTHIAZIDE 25 MG/1
TABLET ORAL
Qty: 90 TABLET | Refills: 3 | Status: SHIPPED | OUTPATIENT
Start: 2020-12-23 | End: 2022-01-17

## 2020-12-23 NOTE — TELEPHONE ENCOUNTER
Medication: Hydrochlorothiazide  Last visit: 10/09/20  Next visit: 1/15/2021  Last refill: 9/3/20  Pharmacy: [facility]

## 2021-02-03 ENCOUNTER — OFFICE VISIT (OUTPATIENT)
Dept: INTERNAL MEDICINE CLINIC | Age: 56
End: 2021-02-03
Payer: COMMERCIAL

## 2021-02-03 VITALS
SYSTOLIC BLOOD PRESSURE: 130 MMHG | TEMPERATURE: 98.4 F | DIASTOLIC BLOOD PRESSURE: 68 MMHG | HEIGHT: 70 IN | BODY MASS INDEX: 19.9 KG/M2 | HEART RATE: 91 BPM | WEIGHT: 139 LBS

## 2021-02-03 DIAGNOSIS — Z12.5 PROSTATE CANCER SCREENING: ICD-10-CM

## 2021-02-03 DIAGNOSIS — I10 ESSENTIAL HYPERTENSION: Primary | ICD-10-CM

## 2021-02-03 DIAGNOSIS — I73.9 PERIPHERAL VASCULAR DISEASE (HCC): ICD-10-CM

## 2021-02-03 DIAGNOSIS — E78.5 HYPERLIPIDEMIA LDL GOAL <70: ICD-10-CM

## 2021-02-03 DIAGNOSIS — Z11.59 ENCOUNTER FOR HEPATITIS C SCREENING TEST FOR LOW RISK PATIENT: ICD-10-CM

## 2021-02-03 DIAGNOSIS — L72.0 EPIDERMAL CYST OF NECK: ICD-10-CM

## 2021-02-03 DIAGNOSIS — E55.9 VITAMIN D DEFICIENCY: ICD-10-CM

## 2021-02-03 DIAGNOSIS — Z11.4 SCREENING FOR HIV WITHOUT PRESENCE OF RISK FACTORS: ICD-10-CM

## 2021-02-03 DIAGNOSIS — K55.1 CHRONIC MESENTERIC ISCHEMIA (HCC): ICD-10-CM

## 2021-02-03 DIAGNOSIS — Z72.0 TOBACCO USE: ICD-10-CM

## 2021-02-03 DIAGNOSIS — Z13.29 SCREENING FOR THYROID DISORDER: ICD-10-CM

## 2021-02-03 PROCEDURE — 99213 OFFICE O/P EST LOW 20 MIN: CPT | Performed by: NURSE PRACTITIONER

## 2021-02-03 RX ORDER — ASPIRIN 81 MG/1
81 TABLET, CHEWABLE ORAL DAILY
COMMUNITY
End: 2021-02-03

## 2021-02-03 RX ORDER — CLOPIDOGREL BISULFATE 75 MG/1
75 TABLET ORAL DAILY
Qty: 90 TABLET | Refills: 3 | Status: SHIPPED | OUTPATIENT
Start: 2021-02-03 | End: 2022-02-02

## 2021-02-03 RX ORDER — ERGOCALCIFEROL (VITAMIN D2) 1250 MCG
50000 CAPSULE ORAL
COMMUNITY
Start: 2020-04-07 | End: 2021-05-03

## 2021-02-03 RX ORDER — THIAMINE HCL 50 MG
2 TABLET ORAL
COMMUNITY
Start: 2020-11-23 | End: 2021-02-03

## 2021-02-03 RX ORDER — LANOLIN ALCOHOL/MO/W.PET/CERES
100 CREAM (GRAM) TOPICAL DAILY
COMMUNITY
End: 2021-03-23 | Stop reason: SDUPTHER

## 2021-02-03 RX ORDER — BENZONATATE 100 MG/1
CAPSULE ORAL
COMMUNITY
Start: 2020-06-24 | End: 2021-05-03

## 2021-02-03 ASSESSMENT — PATIENT HEALTH QUESTIONNAIRE - PHQ9: SUM OF ALL RESPONSES TO PHQ QUESTIONS 1-9: 0

## 2021-02-03 NOTE — PROGRESS NOTES
Visit Information    Have you changed or started any medications since your last visit including any over-the-counter medicines, vitamins, or herbal medicines? no   Are you having any side effects from any of your medications? -  no  Have you stopped taking any of your medications? Is so, why? -  no    Have you seen any other physician or provider since your last visit? No  Have you had any other diagnostic tests since your last visit? No  Have you been seen in the emergency room and/or had an admission to a hospital since we last saw you? No  Have you had your routine dental cleaning in the past 6 months? no    Have you activated your [employer] account? If not, what are your barriers?  Yes     Patient Care Team:  MARC Prado CNP as PCP - General (Nurse Practitioner)  MARC Prado CNP as PCP - [facility]  Ricci Denver, MD as Consulting Physician (Vascular Surgery)    Medical History Review  Past Medical, Family, and Social History reviewed and does contribute to the patient presenting condition    Health Maintenance   Topic Date Due    COVID-19 Vaccine (1 of 2) 04/30/1981    DTaP/Tdap/Td vaccine (1 - Tdap) 04/30/1984    Shingles Vaccine (1 of 2) 04/30/2015    Flu vaccine (1) 09/01/2020    Low dose CT lung screening  01/14/2021    Lipid screen  02/08/2022    Potassium monitoring  02/08/2022    Creatinine monitoring  02/08/2022    Colon cancer screen colonoscopy  08/18/2022    Pneumococcal 0-64 years Vaccine  Completed    Hepatitis C screen  Completed    HIV screen  Completed    Hepatitis A vaccine  Aged Out    Hepatitis B vaccine  Aged Out    Hib vaccine  Aged Out    Meningococcal (ACWY) vaccine  Aged Out         [address] [facility] [address]  [place] [address]  Dept: [phone]  Dept Fax: [phone]    OfficeProgress/Follow Up Note  Date of patient's visit: 2/15/2021  Patient's Name:  Alex Limon YOB: 1965 Patient Care Team:  MARC Quintero CNP as PCP - General (Nurse Practitioner)  MARC Quintero CNP as PCP - [facility] Empaneled Provider  Leonides Stark MD as Consulting Physician (Vascular Surgery)    REASON FOR VISIT:  Routine outpatient follow up    HISTORY OF PRESENT ILLNESS:      Chief Complaint   Patient presents with    Hypertension    Establish Care       History was obtained from the patient. Umberto Howard is a 54 y.o. male here today to establish care with another provider in the office and for routine medical follow up of chronic medical problems. Patient was referred to ENT for left sided neck mass in May 2020 due to previous ultrasound revealing an ovoid hypoechoic mass. Patient has not followed up with specialist. Patient denies changes in neck mass. No warmth, erythema, facial swelling, or additional masses appreciated.        Patient Active Problem List   Diagnosis    Hypertension    Peripheral vascular disease ([address]    Hypokalemia    History of endarterectomy    Hyperlipidemia LDL goal <70    Atherosclerosis of native artery of extremity with intermittent claudication (HCC)    Chronic mesenteric ischemia (HCC)    SOB (shortness of breath) on exertion    History of cardiovascular stress test       Health Maintenance Due   Topic Date Due    COVID-19 Vaccine (1 of 2) 04/30/1981    DTaP/Tdap/Td vaccine (1 - Tdap) 04/30/1984    Shingles Vaccine (1 of 2) 04/30/2015    Flu vaccine (1) 09/01/2020    Low dose CT lung screening  01/14/2021       MEDICATIONS:      Current Outpatient Medications   Medication Sig Dispense Refill    thiamine 100 MG tablet Take 100 mg by mouth daily      clopidogrel (PLAVIX) 75 MG tablet Take 1 tablet by mouth daily 90 tablet 3    hydroCHLOROthiazide (HYDRODIURIL) 25 MG tablet TAKE 1 TABLET BY MOUTH EVERY DAY 90 tablet 3    fluticasone (FLONASE) 50 MCG/ACT nasal spray       atorvastatin (LIPITOR) 80 MG tablet Take 1 tablet by mouth daily 30 tablet 3    BABY ASPIRIN PO Take 81 mg by mouth      benzonatate (TESSALON) 100 MG capsule       ergocalciferol (ERGOCALCIFEROL) 1.25 MG (78419 UT) capsule Take 50,000 Units by mouth      omeprazole (PRILOSEC) 20 MG delayed release capsule       vitamin D (CHOLECALCIFEROL) 62588 UNIT CAPS       Blood Pressure KIT 1 kit by Does not apply route 2 times daily (Patient not taking: Reported on 2/3/2021) 1 kit 0    olopatadine (PATADAY) 0.2 % SOLN ophthalmic solution Place 1 drop into both eyes daily (Patient not taking: Reported on 2/3/2021) 1 Bottle 0     No current facility-administered medications for this visit. ALLERGIES:    No Known Allergies      SOCIAL HISTORY    Reviewed and no change from previous record. Sidney Fleischer  reports that he has been smoking cigarettes. He has a 30.00 pack-year smoking history. He has never used smokeless tobacco.    FAMILY HISTORY:    Reviewed and No change from previous visit    REVIEW OF SYSTEMS:    Review of Systems   Constitutional: Negative for appetite change, diaphoresis, fatigue, fever and unexpected weight change. HENT: Negative for ear pain, postnasal drip, rhinorrhea, sinus pain, sore throat and trouble swallowing. Eyes: Negative for visual disturbance. Respiratory: Negative for cough, chest tightness, shortness of breath and wheezing. Cardiovascular: Negative for chest pain, palpitations and leg swelling. Gastrointestinal: Negative for abdominal pain, blood in stool, constipation, diarrhea, nausea and vomiting. Endocrine: Negative for polydipsia, polyphagia and polyuria. Genitourinary: Negative for difficulty urinating, dysuria and flank pain. Musculoskeletal: Negative for arthralgias and myalgias. Skin: Positive for wound (L sided neck mass). Negative for rash. Neurological: Negative for dizziness, syncope and weakness. Hematological: Bruises/bleeds easily (compliant with aspirin/plavix).    All other systems reviewed and are

## 2021-02-08 ENCOUNTER — HOSPITAL ENCOUNTER (OUTPATIENT)
Age: 56
Setting detail: SPECIMEN
Discharge: HOME | End: 2021-02-08
Payer: COMMERCIAL

## 2021-02-08 DIAGNOSIS — Z11.4 SCREENING FOR HIV WITHOUT PRESENCE OF RISK FACTORS: ICD-10-CM

## 2021-02-08 DIAGNOSIS — E78.5 HYPERLIPIDEMIA LDL GOAL <70: ICD-10-CM

## 2021-02-08 DIAGNOSIS — Z13.29 SCREENING FOR THYROID DISORDER: ICD-10-CM

## 2021-02-08 DIAGNOSIS — E55.9 VITAMIN D DEFICIENCY: ICD-10-CM

## 2021-02-08 DIAGNOSIS — I10 ESSENTIAL HYPERTENSION: ICD-10-CM

## 2021-02-08 DIAGNOSIS — Z11.59 ENCOUNTER FOR HEPATITIS C SCREENING TEST FOR LOW RISK PATIENT: ICD-10-CM

## 2021-02-08 DIAGNOSIS — Z12.5 PROSTATE CANCER SCREENING: ICD-10-CM

## 2021-02-08 LAB
ABSOLUTE EOS #: 0.03 K/UL (ref 0–0.44)
ABSOLUTE IMMATURE GRANULOCYTE: 0.04 K/UL (ref 0–0.3)
ABSOLUTE LYMPH #: 2.09 K/UL (ref 1.1–3.7)
ABSOLUTE MONO #: 0.9 K/UL (ref 0.1–1.2)
ALBUMIN SERPL-MCNC: 4.2 G/DL (ref 3.5–5.2)
ALBUMIN/GLOBULIN RATIO: 1.1 (ref 1–2.5)
ALP BLD-CCNC: 77 U/L (ref 40–129)
ALT SERPL-CCNC: 19 U/L (ref 5–41)
ANION GAP SERPL CALCULATED.3IONS-SCNC: 17 MMOL/L (ref 9–17)
AST SERPL-CCNC: 30 U/L
BASOPHILS # BLD: 1 % (ref 0–2)
BASOPHILS ABSOLUTE: 0.07 K/UL (ref 0–0.2)
BILIRUB SERPL-MCNC: 1.15 MG/DL (ref 0.3–1.2)
BUN BLDV-MCNC: 8 MG/DL (ref 6–20)
BUN/CREAT BLD: ABNORMAL (ref 9–20)
CALCIUM SERPL-MCNC: 9.5 MG/DL (ref 8.6–10.4)
CHLORIDE BLD-SCNC: 96 MMOL/L (ref 98–107)
CHOLESTEROL/HDL RATIO: 2.4
CHOLESTEROL: 152 MG/DL
CO2: 23 MMOL/L (ref 20–31)
CREAT SERPL-MCNC: 0.86 MG/DL (ref 0.7–1.2)
DIFFERENTIAL TYPE: ABNORMAL
EOSINOPHILS RELATIVE PERCENT: 0 % (ref 1–4)
GFR AFRICAN AMERICAN: >60 ML/MIN
GFR NON-AFRICAN AMERICAN: >60 ML/MIN
GFR SERPL CREATININE-BSD FRML MDRD: ABNORMAL ML/MIN/{1.73_M2}
GFR SERPL CREATININE-BSD FRML MDRD: ABNORMAL ML/MIN/{1.73_M2}
GLUCOSE BLD-MCNC: 69 MG/DL (ref 70–99)
HCT VFR BLD CALC: 55.7 % (ref 40.7–50.3)
HDLC SERPL-MCNC: 63 MG/DL
HEMOGLOBIN: 18.9 G/DL (ref 13–17)
HEPATITIS C ANTIBODY: NONREACTIVE
HIV AG/AB: NONREACTIVE
IMMATURE GRANULOCYTES: 0 %
LDL CHOLESTEROL: 73 MG/DL (ref 0–130)
LYMPHOCYTES # BLD: 23 % (ref 24–43)
MCH RBC QN AUTO: 34.7 PG (ref 25.2–33.5)
MCHC RBC AUTO-ENTMCNC: 33.9 G/DL (ref 28.4–34.8)
MCV RBC AUTO: 102.2 FL (ref 82.6–102.9)
MONOCYTES # BLD: 10 % (ref 3–12)
NRBC AUTOMATED: 0 PER 100 WBC
PDW BLD-RTO: 14.1 % (ref 11.8–14.4)
PLATELET # BLD: 213 K/UL (ref 138–453)
PLATELET ESTIMATE: ABNORMAL
PMV BLD AUTO: 10 FL (ref 8.1–13.5)
POTASSIUM SERPL-SCNC: 5.1 MMOL/L (ref 3.7–5.3)
PROSTATE SPECIFIC ANTIGEN: 0.4 UG/L
RBC # BLD: 5.45 M/UL (ref 4.21–5.77)
RBC # BLD: ABNORMAL 10*6/UL
SEG NEUTROPHILS: 66 % (ref 36–65)
SEGMENTED NEUTROPHILS ABSOLUTE COUNT: 5.97 K/UL (ref 1.5–8.1)
SODIUM BLD-SCNC: 136 MMOL/L (ref 135–144)
TOTAL PROTEIN: 8 G/DL (ref 6.4–8.3)
TRIGL SERPL-MCNC: 81 MG/DL
TSH SERPL DL<=0.05 MIU/L-ACNC: 2.17 MIU/L (ref 0.3–5)
VITAMIN D 25-HYDROXY: 21.8 NG/ML (ref 30–100)
VLDLC SERPL CALC-MCNC: NORMAL MG/DL (ref 1–30)
WBC # BLD: 9.1 K/UL (ref 3.5–11.3)
WBC # BLD: ABNORMAL 10*3/UL

## 2021-02-15 ASSESSMENT — ENCOUNTER SYMPTOMS
SORE THROAT: 0
BLOOD IN STOOL: 0
ABDOMINAL PAIN: 0
WHEEZING: 0
SINUS PAIN: 0
DIARRHEA: 0
COUGH: 0
TROUBLE SWALLOWING: 0
RHINORRHEA: 0
CHEST TIGHTNESS: 0
VOMITING: 0
NAUSEA: 0
SHORTNESS OF BREATH: 0
CONSTIPATION: 0

## 2021-03-10 ENCOUNTER — HOSPITAL ENCOUNTER (OUTPATIENT)
Dept: CT IMAGING | Age: 56
Discharge: HOME | End: 2021-03-12
Payer: COMMERCIAL

## 2021-03-10 PROCEDURE — 2580000003 HC RX 258: Performed by: NURSE PRACTITIONER

## 2021-03-10 PROCEDURE — 6360000004 HC RX CONTRAST MEDICATION: Performed by: NURSE PRACTITIONER

## 2021-03-10 PROCEDURE — 70491 CT SOFT TISSUE NECK W/DYE: CPT

## 2021-03-10 RX ORDER — SODIUM CHLORIDE 0.9 % (FLUSH) 0.9 %
10 SYRINGE (ML) INJECTION PRN
Status: DISCONTINUED | OUTPATIENT
Start: 2021-03-10 | End: 2021-03-13 | Stop reason: HOSPADM

## 2021-03-10 RX ORDER — 0.9 % SODIUM CHLORIDE 0.9 %
80 INTRAVENOUS SOLUTION INTRAVENOUS ONCE
Status: COMPLETED | OUTPATIENT
Start: 2021-03-10 | End: 2021-03-10

## 2021-03-10 RX ADMIN — IOPAMIDOL 75 ML: 755 INJECTION, SOLUTION INTRAVENOUS at 14:28

## 2021-03-10 RX ADMIN — SODIUM CHLORIDE 80 ML: 9 INJECTION, SOLUTION INTRAVENOUS at 14:28

## 2021-03-10 RX ADMIN — Medication 10 ML: at 14:28

## 2021-03-23 ENCOUNTER — NURSE TRIAGE (OUTPATIENT)
Dept: OTHER | Facility: CLINIC | Age: 56
End: 2021-03-23

## 2021-03-23 NOTE — TELEPHONE ENCOUNTER
Patient called Bennie at [facility] pre-service center [facility])  with red flag complaint. Brief description of triage: Coughing up blood, states he had pneumonia last time this happened. Triage indicates for patient to be seen within 24 hours. Care advice provided, patient verbalizes understanding; denies any other questions or concerns; instructed to call back for any new or worsening symptoms. Writer provided warm transfer to [facility] at [facility] for appointment scheduling. Attention Provider: Thank you for allowing me to participate in the care of your patient. The patient was connected to triage in response to information provided to the [facility]. Please do not respond through this encounter as the response is not directed to a shared pool. Reason for Disposition   Coughing up alan-colored sputum    Answer Assessment - Initial Assessment Questions  1. ONSET: \"When did you start coughing up blood? \"      Last night    2. SEVERITY: \"How many times? \" \"How much blood? \" (e.g., flecks, streaks, tablespoons, etc)      Mostly red    3. COUGHING SPASMS: \"Did the blood appear after a coughing spell? \"        Every so often    4. RESPIRATORY DISTRESS: \"Describe your breathing. \"       Normal    5. FEVER: \"Do you have a fever? \" If so, ask: \"What is your temperature, how was it measured, and when did it start? \"      No    6. SPUTUM: \"Describe the color of your sputum\" (clear, white, yellow, green), \"Has there been any change recently? \"      Red    7. CARDIAC HISTORY: \"Do you have any history of heart disease? \" (e.g., heart attack, congestive heart failure)       Leg stents a few days ago    8. LUNG HISTORY: \"Do you have any history of lung disease? \"  (e.g., pulmonary embolus, asthma, emphysema)      No    9. PE RISK FACTORS: \"Do you have a history of blood clots? \" (or: recent major surgery, recent prolonged travel, bedridden)      No    10. OTHER SYMPTOMS: \"Do you have any other symptoms? \" (e.g., nosebleed, chest pain, abdominal pain, vomiting)        Sinus congestion    11. PREGNANCY: \"Is there any chance you are pregnant? \" \"When was your last menstrual period? \"        N/A    12. TRAVEL: \"Have you traveled out of the country in the last month? \" (e.g., travel history, exposures)        No    Protocols used: COUGHING UP BLOOD-ADULT-AH

## 2021-03-24 ENCOUNTER — TELEMEDICINE (OUTPATIENT)
Dept: INTERNAL MEDICINE CLINIC | Age: 56
End: 2021-03-24
Payer: COMMERCIAL

## 2021-03-24 ENCOUNTER — HOSPITAL ENCOUNTER (OUTPATIENT)
Facility: CLINIC | Age: 56
Discharge: HOME | End: 2021-03-26
Payer: COMMERCIAL

## 2021-03-24 ENCOUNTER — HOSPITAL ENCOUNTER (OUTPATIENT)
Dept: GENERAL RADIOLOGY | Facility: CLINIC | Age: 56
Discharge: HOME | End: 2021-03-26
Payer: COMMERCIAL

## 2021-03-24 DIAGNOSIS — R05.9 COUGH: Primary | ICD-10-CM

## 2021-03-24 DIAGNOSIS — R05.9 COUGH: ICD-10-CM

## 2021-03-24 PROCEDURE — 99213 OFFICE O/P EST LOW 20 MIN: CPT | Performed by: NURSE PRACTITIONER

## 2021-03-24 PROCEDURE — 71046 X-RAY EXAM CHEST 2 VIEWS: CPT

## 2021-03-24 ASSESSMENT — ENCOUNTER SYMPTOMS
SORE THROAT: 0
NAUSEA: 0
COUGH: 1
WHEEZING: 0
SHORTNESS OF BREATH: 0
CONSTIPATION: 0
VOMITING: 0
RHINORRHEA: 0
SINUS PAIN: 0
DIARRHEA: 0
TROUBLE SWALLOWING: 0
BLOOD IN STOOL: 0
ABDOMINAL PAIN: 0
CHEST TIGHTNESS: 0

## 2021-03-24 NOTE — PROGRESS NOTES
Visit Information    Have you changed or started any medications since your last visit including any over-the-counter medicines, vitamins, or herbal medicines? no   Are you having any side effects from any of your medications? -  no  Have you stopped taking any of your medications? Is so, why? -  no    Have you seen any other physician or provider since your last visit? No  Have you had any other diagnostic tests since your last visit? Yes - Records Requested  Have you been seen in the emergency room and/or had an admission to a hospital since we last saw you? No  Have you had your routine dental cleaning in the past 6 months? no    Have you activated your [employer] account? If not, what are your barriers?  Yes     Patient Care Team:  MARC Reynolds CNP as PCP - General (Nurse Practitioner)  MARC Reynolds CNP as PCP - [facility] Provider  Orlando Jimenez MD as Consulting Physician (Vascular Surgery)    Medical History Review  Past Medical, Family, and Social History reviewed and does not contribute to the patient presenting condition    Health Maintenance   Topic Date Due    COVID-19 Vaccine (1) Never done    DTaP/Tdap/Td vaccine (1 - Tdap) Never done    Shingles Vaccine (1 of 2) Never done    Flu vaccine (1) Never done    Low dose CT lung screening  01/14/2021    Lipid screen  02/08/2022    Potassium monitoring  02/08/2022    Creatinine monitoring  02/08/2022    Colon cancer screen colonoscopy  08/18/2022    Pneumococcal 0-64 years Vaccine  Completed    Hepatitis C screen  Completed    HIV screen  Completed    Hepatitis A vaccine  Aged Out    Hepatitis B vaccine  Aged Out    Hib vaccine  Aged Out    Meningococcal (ACWY) vaccine  Aged Out         [address] [address]  Dept: [phone]  Dept Fax: [phone]    Virtual Visit Progress Note  Date of patient's visit: 3/24/2021  Patient's Name:  Gabriela Aretaga Date of Birth: 1965            Patient Care Team:  MARC Anthony CNP as PCP - General (Nurse Practitioner)  MARC Anthony CNP as PCP - [facility]  Kiana Khan MD as Consulting Physician (Vascular Surgery)    REASON FOR VISIT:  Same day visit    HISTORY OF PRESENT ILLNESS:      Chief Complaint   Patient presents with    Cough     onset 3/22, states sputum is about 2/3 bloody        History was obtained from the patient. Prince Wesley is a 54 y.o. male here today virtually for productive cough with alan colored sputum. Patient reports feeling well, denies URI symptoms.       Patient Active Problem List   Diagnosis    Hypertension    Peripheral vascular disease ([address]    Hypokalemia    History of endarterectomy    Hyperlipidemia LDL goal <70    Atherosclerosis of native artery of extremity with intermittent claudication (HCC)    Chronic mesenteric ischemia (HCC)    SOB (shortness of breath) on exertion    History of cardiovascular stress test       MEDICATIONS:      Current Outpatient Medications   Medication Sig Dispense Refill    clopidogrel (PLAVIX) 75 MG tablet Take 1 tablet by mouth daily 90 tablet 3    hydroCHLOROthiazide (HYDRODIURIL) 25 MG tablet TAKE 1 TABLET BY MOUTH EVERY DAY 90 tablet 3    vitamin D (CHOLECALCIFEROL) 56500 UNIT CAPS       fluticasone (FLONASE) 50 MCG/ACT nasal spray       atorvastatin (LIPITOR) 80 MG tablet Take 1 tablet by mouth daily 30 tablet 3    BABY ASPIRIN PO Take 81 mg by mouth      benzonatate (TESSALON) 100 MG capsule       ergocalciferol (ERGOCALCIFEROL) 1.25 MG (68481 UT) capsule Take 50,000 Units by mouth      thiamine 100 MG tablet Take 100 mg by mouth daily      omeprazole (PRILOSEC) 20 MG delayed release capsule       Blood Pressure KIT 1 kit by Does not apply route 2 times daily (Patient not taking: Reported on 2/3/2021) 1 kit 0    olopatadine (PATADAY) 0.2 % SOLN ophthalmic solution Place 1 drop into both eyes daily (Patient not taking: Reported on 2/3/2021) 1 Bottle 0     No current facility-administered medications for this visit. ALLERGIES:    No Known Allergies    SOCIAL HISTORY   Reviewed and no change from previous record. Elton Small  reports that he has been smoking cigarettes. He has a 30.00 pack-year smoking history. He has never used smokeless tobacco.    FAMILY HISTORY:    Reviewed and No change from previous visit    REVIEW OF SYSTEMS:    Review of Systems   Constitutional: Negative for appetite change, diaphoresis, fatigue, fever and unexpected weight change. HENT: Negative for ear pain, postnasal drip, rhinorrhea, sinus pain, sore throat and trouble swallowing. Eyes: Negative for visual disturbance. Respiratory: Positive for cough. Negative for chest tightness, shortness of breath and wheezing. Cardiovascular: Negative for chest pain, palpitations and leg swelling. Gastrointestinal: Negative for abdominal pain, blood in stool, constipation, diarrhea, nausea and vomiting. Endocrine: Negative for polydipsia, polyphagia and polyuria. Genitourinary: Negative for difficulty urinating, dysuria and flank pain. Musculoskeletal: Negative for arthralgias and myalgias. Skin: Negative for rash and wound. Neurological: Negative for dizziness, syncope and weakness. All other systems reviewed and are negative. PHYSICAL EXAM:    There were no vitals filed for this visit. Exam was limited due to virtual encounter.     General - alert, well appearing, non toxic, in no distress  Skin - normal coloration and turgor, no rash  Eyes - sclera appear clear, no conjunctival injection, no discharge  Ears - no pain with manipulation of tragus or auricle, no drainage, no mastoid tenderness, hearing normal   Nose - normal and patent, no erythema, discharge  Mouth - mucous membranes are moist  Neck - supple, no masses appreciated  Chest - respirations are unlabored, no tachypnea or signs or respiratory distress  Abdomen - soft, nontender, nondistended  Neurological - alert, oriented x 3, normal speech  Extremities - no pedal edema    ASSESSMENT AND PLAN:      1. Cough  - would like to evaluate for possible pneumonia, patient is asymptomatic possible URI. - XR CHEST STANDARD (2 VW); Future      FOLLOW UP AND INSTRUCTIONS:   No follow-ups on file. Discussed use, benefit, and side effects of prescribed medications. All patient questions answered. Patient voiced understanding. Patient given educational materials - see patient instructions    Patient being evaluated by a Virtual Visit (video visit) encounter to address concerns as mentioned above. A caregiver was present when appropriate. Due to this being a TeleHealth encounter (During UYYJQ-80 public health emergency), evaluation of the following organ systems was limited: Vitals/Constitutional/EENT/Resp/CV/GI//MS/Neuro/Skin/Heme-Lymph-Imm. Pursuant to the emergency declaration under the [address] [place] [address], [address] [place] [address] and the [employer] Act, this Virtual Visit was conducted with patient's (and/or legal guardian's) consent, to reduce the patient's risk of exposure to COVID-19 and provide necessary medical care. The patient (and/or legal guardian) has also been advised to contact this office for worsening conditions or problems, and seek emergency medical treatment and/or call 911 if deemed necessary. Services were provided through a video synchronous discussion virtually to substitute for in-person clinic visit. Patient and provider were located at their individual homes. Albin Phalen, APRN - CNP   [facility]  3/24/2021, 12:38 PM    Please note that this chart wasgenerated using voice recognition Dragon dictation software.   Although every effort was made to ensure the accuracy of this automated transcription, some errors in transcription

## 2021-03-25 ENCOUNTER — TELEPHONE (OUTPATIENT)
Dept: INTERNAL MEDICINE CLINIC | Age: 56
End: 2021-03-25

## 2021-03-25 RX ORDER — LANOLIN ALCOHOL/MO/W.PET/CERES
100 CREAM (GRAM) TOPICAL DAILY
Qty: 30 TABLET | Refills: 0 | Status: SHIPPED | OUTPATIENT
Start: 2021-03-25 | End: 2021-05-16

## 2021-03-26 DIAGNOSIS — J18.9 PNEUMONIA OF RIGHT UPPER LOBE DUE TO INFECTIOUS ORGANISM: Primary | ICD-10-CM

## 2021-03-26 RX ORDER — LEVOFLOXACIN 750 MG/1
750 TABLET ORAL DAILY
Qty: 7 TABLET | Refills: 0 | Status: SHIPPED | OUTPATIENT
Start: 2021-03-26 | End: 2021-04-02

## 2021-05-03 ENCOUNTER — OFFICE VISIT (OUTPATIENT)
Dept: INTERNAL MEDICINE CLINIC | Age: 56
End: 2021-05-03
Payer: COMMERCIAL

## 2021-05-03 VITALS
HEIGHT: 70 IN | BODY MASS INDEX: 19.9 KG/M2 | OXYGEN SATURATION: 97 % | TEMPERATURE: 99.8 F | SYSTOLIC BLOOD PRESSURE: 128 MMHG | DIASTOLIC BLOOD PRESSURE: 82 MMHG | HEART RATE: 74 BPM | WEIGHT: 139 LBS

## 2021-05-03 DIAGNOSIS — Z87.891 PERSONAL HISTORY OF TOBACCO USE: ICD-10-CM

## 2021-05-03 DIAGNOSIS — K55.1 CHRONIC MESENTERIC ISCHEMIA (HCC): Primary | ICD-10-CM

## 2021-05-03 DIAGNOSIS — I73.9 PERIPHERAL VASCULAR DISEASE (HCC): ICD-10-CM

## 2021-05-03 DIAGNOSIS — L72.0 EPIDERMAL CYST OF NECK: ICD-10-CM

## 2021-05-03 PROCEDURE — G0296 VISIT TO DETERM LDCT ELIG: HCPCS | Performed by: NURSE PRACTITIONER

## 2021-05-03 PROCEDURE — 99213 OFFICE O/P EST LOW 20 MIN: CPT | Performed by: NURSE PRACTITIONER

## 2021-05-03 RX ORDER — MELATONIN
1000 DAILY
Qty: 90 TABLET | Refills: 1
Start: 2021-05-03

## 2021-05-03 ASSESSMENT — PATIENT HEALTH QUESTIONNAIRE - PHQ9
SUM OF ALL RESPONSES TO PHQ QUESTIONS 1-9: 1
SUM OF ALL RESPONSES TO PHQ9 QUESTIONS 1 & 2: 1
2. FEELING DOWN, DEPRESSED OR HOPELESS: 0

## 2021-05-03 NOTE — PROGRESS NOTES
Visit Information    Have you changed or started any medications since your last visit including any over-the-counter medicines, vitamins, or herbal medicines? no   Are you having any side effects from any of your medications? -  no  Have you stopped taking any of your medications? Is so, why? -  no    Have you seen any other physician or provider since your last visit? Yes - Records Obtained  Have you had any other diagnostic tests since your last visit? Yes - Records Obtained  Have you been seen in the emergency room and/or had an admission to a hospital since we last saw you? No  Have you had your routine dental cleaning in the past 6 months? no    Have you activated your [employer] account? If not, what are your barriers?  Yes     Patient Care Team:  MARC Gavin CNP as PCP - General (Nurse Practitioner)  MARC Gavin CNP as PCP - [facility]  Javi Wallace MD as Consulting Physician (Vascular Surgery)  Hilary Plata RN as Imaging Navigator    Medical History Review  Past Medical, Family, and Social History reviewed and does not contribute to the patient presenting condition    Health Maintenance   Topic Date Due    COVID-19 Vaccine (1) Never done    DTaP/Tdap/Td vaccine (1 - Tdap) Never done    Shingles Vaccine (1 of 2) Never done    Flu vaccine (Season Ended) 09/01/2021    Lipid screen  02/08/2022    Potassium monitoring  02/08/2022    Creatinine monitoring  02/08/2022    Low dose CT lung screening  05/12/2022    Colon cancer screen colonoscopy  08/18/2022    Pneumococcal 0-64 years Vaccine (2 of 2) 04/30/2030    Hepatitis C screen  Completed    HIV screen  Completed    Hepatitis A vaccine  Aged Out    Hepatitis B vaccine  Aged Out    Hib vaccine  Aged Out    Meningococcal (ACWY) vaccine  Aged Out         [address] [address]  Dept: [phone]  Dept Fax: [phone]    OfficeProgress/Follow Up Note  Date of patient's visit: 5/16/2021  Patient's Name:  Edmond Walker YOB: 1965            Patient Care Team:  Claudette Goldstein, APRN - CNP as PCP - General (Nurse Practitioner)  Claudette Goldstein, APRN - CNP as PCP - [facility]  Gladys Fernandez MD as Consulting Physician (Vascular Surgery)  Lauren Kitchen RN as Imaging Navigator    REASON FOR VISIT:  Routine outpatient follow up    HISTORY OF PRESENT ILLNESS:      Chief Complaint   Patient presents with    Hypertension     3 month fu       History was obtained from the patient. Edmond Walker is a 64 y.o. male here today for hypertension follow up. 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 Patient endorses increased satiety, and decreased appetite occurring progressively over the past couple of months. Denies abdominal pain, nausea, vomiting, constipation, diarrhea, BRBPR, or melena.      Patient Active Problem List   Diagnosis    Hypertension    Peripheral vascular disease ([facility]    Hypokalemia    History of endarterectomy    Hyperlipidemia LDL goal <70    Atherosclerosis of native artery of extremity with intermittent claudication (HCC)    Chronic mesenteric ischemia (HCC)    SOB (shortness of breath) on exertion    History of cardiovascular stress test       Health Maintenance Due   Topic Date Due    COVID-19 Vaccine (1) Never done    DTaP/Tdap/Td vaccine (1 - Tdap) Never done    Shingles Vaccine (1 of 2) Never done       MEDICATIONS:      Current Outpatient Medications   Medication Sig Dispense Refill    vitamin D3 (CHOLECALCIFEROL) 25 MCG (1000 UT) TABS tablet Take 1 tablet by mouth daily 90 tablet 1    thiamine 100 MG tablet Take 1 tablet by mouth daily 30 tablet 0    clopidogrel (PLAVIX) 75 MG tablet Take 1 tablet by mouth daily 90 tablet 3    hydroCHLOROthiazide (HYDRODIURIL) 25 MG tablet TAKE 1 TABLET BY MOUTH EVERY DAY 90 tablet 3    fluticasone (FLONASE) 50 MCG/ACT nasal Neck:      Vascular: No carotid bruit. Cardiovascular:      Rate and Rhythm: Normal rate and regular rhythm. Pulses: Normal pulses. Heart sounds: Normal heart sounds. Pulmonary:      Effort: Pulmonary effort is normal.      Breath sounds: Normal breath sounds. Abdominal:      General: Bowel sounds are normal.      Palpations: Abdomen is soft. Musculoskeletal:         General: No swelling, tenderness or deformity. Normal range of motion. Cervical back: Normal range of motion. Skin:     General: Skin is warm and dry. Neurological:      General: No focal deficit present. Mental Status: He is alert and oriented to person, place, and time. Psychiatric:         Mood and Affect: Mood normal.         Behavior: Behavior normal.         Thought Content: Thought content normal.         Judgment: Judgment normal.          LABORATORY FINDINGS:    CBC:  Lab Results   Component Value Date    WBC 9.1 02/08/2021    HGB 18.9 02/08/2021     02/08/2021       BMP:    Lab Results   Component Value Date     02/08/2021    K 5.1 02/08/2021    CL 96 02/08/2021    CO2 23 02/08/2021    BUN 8 02/08/2021    CREATININE 0.86 02/08/2021    GLUCOSE 69 02/08/2021       HEMOGLOBIN A1C: No results found for: LABA1C    FASTING LIPID PANEL:  Lab Results   Component Value Date    CHOL 152 02/08/2021    HDL 63 02/08/2021    TRIG 81 02/08/2021       ASSESSMENT AND PLAN:      1. Chronic mesenteric ischemia (HCC)/ 2. Peripheral vascular disease ([facility] [address]  - follows with vascular  - continue aspirin and plavix  - encourage smoking cessation    3. Epidermal cyst of neck  - plans to follow up ENT    4. Personal history of tobacco use  - OK VISIT TO DISCUSS LUNG CA SCREEN W LDCT  - CT Lung Screen (Annual); Future      FOLLOW UP AND INSTRUCTIONS:   Return in about 6 months (around 11/3/2021), or if symptoms worsen or fail to improve.     Diamante Clifford received counseling on the following healthy behaviors: nutrition, exercise, medication adherence and tobacco cessation    Discussed use, benefit, and side effects of prescribed medications. Barriers to medication compliance addressed. All patient questions answered. Patient voiced understanding. Patient given educational materials - see patient instructions    MARC Martin CNP   [facility]  5/16/2021, 2:55 PM    Please note that this chart was generated using voice recognition Dragon dictation software. Although everyeffort was made to ensure the accuracy of this automated transcription, some errors in transcription may have occurred. Low Dose CT (LDCT) Lung Screening criteria met   Age 50-69   Pack year smoking >30   Still smoking or less than 15 year since quit   No sign or symptoms of lung cancer   > 11 months since last LDCT     Risks and benefits of lung cancer screening with LDCT scans discussed:    Significance of positive screen - False-positive LDCT results often occur. 95% of all positive results do not lead to a diagnosis of cancer. Usually further imaging can resolve most false-positive results; however, some patients may require invasive procedures. Over diagnosis risk - 10% to 12% of screen-detected lung cancer cases are over diagnosed--that is, the cancer would not have been detected in the patient's lifetime without the screening. Need for follow up screens annually to continue lung cancer screening effectiveness     Risks associated with radiation from annual LDCT- Radiation exposure is about the same as for a mammogram, which is about 1/3 of the annual background radiation exposure from everyday life. Starting screening at age 54 is not likely to increase cancer risk from radiation exposure. Patients with comorbidities resulting in life expectancy of < 10 years, or that would preclude treatment of an abnormality identified on CT, should not be screened due to lack of benefit.     To obtain maximal benefit from this screening, smoking cessation and long-term abstinence from smoking is critical

## 2021-05-05 ENCOUNTER — TELEPHONE (OUTPATIENT)
Dept: ONCOLOGY | Age: 56
End: 2021-05-05

## 2021-05-05 NOTE — LETTER
5/5/2021        Colette Lane  [address]  [place] [address]    Dear Colette Lane: Your healthcare provider has ordered a low dose CT scan of the chest for lung cancer screening. You will find enclosed, information about CT lung screening. Please review the statement of understanding, you will be asked to sign a copy of this at the time of your CT scan    If you have not already been contacted to make the appointment for your scan, please call our scheduling department at [phone]    Keep in mind that CT lung screening does not take the place of smoking cessation. If you are a current smoker, you will find enclosed smoking cessation resources. Please do not hesitate to contact me if you have any questions or concerns.     [address],      [address] [facility] Lung Screening Program  [phone]

## 2021-05-12 ENCOUNTER — HOSPITAL ENCOUNTER (OUTPATIENT)
Dept: CT IMAGING | Age: 56
Discharge: HOME | End: 2021-05-14
Payer: COMMERCIAL

## 2021-05-12 DIAGNOSIS — Z87.891 PERSONAL HISTORY OF TOBACCO USE: ICD-10-CM

## 2021-05-12 PROCEDURE — 71271 CT THORAX LUNG CANCER SCR C-: CPT

## 2021-05-16 ASSESSMENT — ENCOUNTER SYMPTOMS
WHEEZING: 0
RHINORRHEA: 0
TROUBLE SWALLOWING: 0
SINUS PAIN: 0
CONSTIPATION: 0
SORE THROAT: 0
BLOOD IN STOOL: 0
DIARRHEA: 0
NAUSEA: 0
SHORTNESS OF BREATH: 0
ABDOMINAL PAIN: 0
VOMITING: 0
CHEST TIGHTNESS: 0
COUGH: 0

## 2021-11-04 ENCOUNTER — OFFICE VISIT (OUTPATIENT)
Dept: INTERNAL MEDICINE CLINIC | Age: 56
End: 2021-11-04
Payer: COMMERCIAL

## 2021-11-04 VITALS — DIASTOLIC BLOOD PRESSURE: 74 MMHG | BODY MASS INDEX: 19.51 KG/M2 | WEIGHT: 136 LBS | SYSTOLIC BLOOD PRESSURE: 130 MMHG

## 2021-11-04 DIAGNOSIS — I73.9 PERIPHERAL VASCULAR DISEASE (HCC): ICD-10-CM

## 2021-11-04 DIAGNOSIS — I10 PRIMARY HYPERTENSION: Primary | ICD-10-CM

## 2021-11-04 DIAGNOSIS — E78.5 HYPERLIPIDEMIA LDL GOAL <70: ICD-10-CM

## 2021-11-04 DIAGNOSIS — Z72.0 TOBACCO ABUSE: ICD-10-CM

## 2021-11-04 PROCEDURE — 99214 OFFICE O/P EST MOD 30 MIN: CPT | Performed by: NURSE PRACTITIONER

## 2021-11-04 RX ORDER — BUPROPION HYDROCHLORIDE 150 MG/1
150 TABLET, EXTENDED RELEASE ORAL 2 TIMES DAILY
Qty: 60 TABLET | Refills: 3 | Status: SHIPPED | OUTPATIENT
Start: 2021-11-04 | End: 2021-12-06

## 2021-11-04 SDOH — ECONOMIC STABILITY: TRANSPORTATION INSECURITY
IN THE PAST 12 MONTHS, HAS THE LACK OF TRANSPORTATION KEPT YOU FROM MEDICAL APPOINTMENTS OR FROM GETTING MEDICATIONS?: NO

## 2021-11-04 SDOH — ECONOMIC STABILITY: TRANSPORTATION INSECURITY
IN THE PAST 12 MONTHS, HAS LACK OF TRANSPORTATION KEPT YOU FROM MEETINGS, WORK, OR FROM GETTING THINGS NEEDED FOR DAILY LIVING?: NO

## 2021-11-04 SDOH — ECONOMIC STABILITY: FOOD INSECURITY: WITHIN THE PAST 12 MONTHS, YOU WORRIED THAT YOUR FOOD WOULD RUN OUT BEFORE YOU GOT MONEY TO BUY MORE.: NEVER TRUE

## 2021-11-04 SDOH — ECONOMIC STABILITY: FOOD INSECURITY: WITHIN THE PAST 12 MONTHS, THE FOOD YOU BOUGHT JUST DIDN'T LAST AND YOU DIDN'T HAVE MONEY TO GET MORE.: NEVER TRUE

## 2021-11-04 ASSESSMENT — ENCOUNTER SYMPTOMS
BLOOD IN STOOL: 0
CHEST TIGHTNESS: 0
TROUBLE SWALLOWING: 0
COUGH: 0
CONSTIPATION: 0
WHEEZING: 0
SINUS PAIN: 0
RHINORRHEA: 0
ABDOMINAL PAIN: 0
SHORTNESS OF BREATH: 0
DIARRHEA: 0
VOMITING: 0
NAUSEA: 0
SORE THROAT: 0

## 2021-11-04 ASSESSMENT — SOCIAL DETERMINANTS OF HEALTH (SDOH): HOW HARD IS IT FOR YOU TO PAY FOR THE VERY BASICS LIKE FOOD, HOUSING, MEDICAL CARE, AND HEATING?: NOT HARD AT ALL

## 2021-11-04 NOTE — PROGRESS NOTES
Visit Information    Have you changed or started any medications since your last visit including any over-the-counter medicines, vitamins, or herbal medicines? no   Are you having any side effects from any of your medications? -  no  Have you stopped taking any of your medications? Is so, why? -  no    Have you seen any other physician or provider since your last visit? Yes - Records Obtained  Have you had any other diagnostic tests since your last visit? Yes - Records Obtained  Have you been seen in the emergency room and/or had an admission to a hospital since we last saw you? Yes - Records Obtained  Have you had your routine dental cleaning in the past 6 months? no    Have you activated your [employer] account? If not, what are your barriers?  Yes     Patient Care Team:  MARC England CNP as PCP - General (Nurse Practitioner)  MARC England CNP as PCP - [facility]  Bella Aldridge MD as Consulting Physician (Vascular Surgery)  Toi Tanner RN as Imaging Navigator    Medical History Review  Past Medical, Family, and Social History reviewed and does not contribute to the patient presenting condition    Health Maintenance   Topic Date Due    COVID-19 Vaccine (1) Never done    DTaP/Tdap/Td vaccine (1 - Tdap) Never done    Shingles Vaccine (1 of 2) Never done    Flu vaccine (1) Never done    Lipid screen  02/08/2022    Potassium monitoring  02/08/2022    Creatinine monitoring  02/08/2022    Low dose CT lung screening  05/12/2022    Colon cancer screen colonoscopy  08/18/2022    Pneumococcal 0-64 years Vaccine (2 of 2 - PPSV23) 04/30/2030    Hepatitis C screen  Completed    HIV screen  Completed    Hepatitis A vaccine  Aged Out    Hepatitis B vaccine  Aged Out    Hib vaccine  Aged Out    Meningococcal (ACWY) vaccine  Aged Out         [address] [address]  Dept: [phone]  Dept Fax: daily 30 tablet 3    BABY ASPIRIN PO Take 81 mg by mouth       No current facility-administered medications for this visit. ALLERGIES:    No Known Allergies      SOCIAL HISTORY    Reviewed and no change from previous record. Alfred aKy  reports that he has been smoking cigarettes. He has a 30.00 pack-year smoking history. He has never used smokeless tobacco.    FAMILY HISTORY:    Reviewed and No change from previous visit    REVIEW OF SYSTEMS:    Review of Systems   Constitutional: Negative for appetite change, chills, diaphoresis, fatigue, fever and unexpected weight change. HENT: Negative for congestion, ear pain, postnasal drip, rhinorrhea, sinus pain, sore throat and trouble swallowing. Eyes: Negative for visual disturbance. Respiratory: Negative for cough, chest tightness, shortness of breath and wheezing. Cardiovascular: Negative for chest pain, palpitations and leg swelling. Gastrointestinal: Negative for abdominal pain, blood in stool, constipation, diarrhea, nausea and vomiting. Endocrine: Negative for polydipsia, polyphagia and polyuria. Genitourinary: Negative for difficulty urinating, dysuria and flank pain. Musculoskeletal: Negative for arthralgias and myalgias. Skin: Negative for rash and wound. Neurological: Negative for dizziness, syncope and weakness. All other systems reviewed and are negative. PHYSICAL EXAM:      Vitals:    11/04/21 0907   BP: 130/74   Site: Left Upper Arm   Weight: 136 lb (61.7 kg)     BP Readings from Last 3 Encounters:   11/04/21 130/74   05/03/21 128/82   02/03/21 130/68      Wt Readings from Last 3 Encounters:   11/04/21 136 lb (61.7 kg)   05/03/21 139 lb (63 kg)   02/03/21 139 lb (63 kg)       Physical Exam  Vitals reviewed. Constitutional:       Appearance: Normal appearance. HENT:      Head: Normocephalic. Cardiovascular:      Rate and Rhythm: Normal rate and regular rhythm. Pulses: Normal pulses.       Heart sounds: Normal heart sounds. Pulmonary:      Effort: Pulmonary effort is normal.      Breath sounds: Normal breath sounds. Abdominal:      General: Bowel sounds are normal.      Palpations: Abdomen is soft. Musculoskeletal:         General: No swelling, tenderness or deformity. Normal range of motion. Skin:     General: Skin is warm and dry. Neurological:      General: No focal deficit present. Mental Status: He is alert and oriented to person, place, and time. Psychiatric:         Mood and Affect: Mood normal.         Behavior: Behavior normal.         Thought Content: Thought content normal.         Judgment: Judgment normal.          LABORATORY FINDINGS:    CBC:  Lab Results   Component Value Date    WBC 9.1 02/08/2021    HGB 18.9 02/08/2021     02/08/2021       BMP:    Lab Results   Component Value Date     02/08/2021    K 5.1 02/08/2021    CL 96 02/08/2021    CO2 23 02/08/2021    BUN 8 02/08/2021    CREATININE 0.86 02/08/2021    GLUCOSE 69 02/08/2021       HEMOGLOBIN A1C: No results found for: LABA1C    FASTING LIPID PANEL:  Lab Results   Component Value Date    CHOL 152 02/08/2021    HDL 63 02/08/2021    TRIG 81 02/08/2021       ASSESSMENT AND PLAN:      1. Primary hypertension  - well controlled, continue HCTZ    2. Peripheral vascular disease ([facility] [address]  - continue ASA, lipitor and plavix  - follows with vascular for PAD and mesenteric artery stenosis  - patient has mesenteric artery stent, denies abdominal pain, nausea, vomiting or decreased appetite. 3. Tobacco abuse  - buPROPion (WELLBUTRIN SR) 150 MG extended release tablet; Take 1 tablet by mouth 2 times daily Take 1 tablet by mouth daily for first three days then take 1 tablet twice daily  Dispense: 60 tablet; Refill: 3    4. Hyperlipidemia LDL goal <70  - continue statin and encourage smoking cessation      FOLLOW UP AND INSTRUCTIONS:   Return in about 6 weeks (around 12/16/2021).     Greta Mclean received counseling on the following healthy behaviors: nutrition, exercise, medication adherence and tobacco cessation    Discussed use, benefit, and side effects of prescribed medications. Barriers to medication compliance addressed. All patient questions answered. Patient voiced understanding. Patient given educational materials - see patient instructions    MARC Veliz - CNP   [facility]  11/19/2021, 8:02 AM    Please note that this chart was generated using voice recognition Dragon dictation software. Although everyeffort was made to ensure the accuracy of this automated transcription, some errors in transcription may have occurred.

## 2021-12-04 DIAGNOSIS — Z72.0 TOBACCO ABUSE: ICD-10-CM

## 2021-12-06 RX ORDER — BUPROPION HYDROCHLORIDE 150 MG/1
TABLET, EXTENDED RELEASE ORAL
Qty: 60 TABLET | Refills: 3 | Status: SHIPPED | OUTPATIENT
Start: 2021-12-06

## 2021-12-16 ENCOUNTER — OFFICE VISIT (OUTPATIENT)
Dept: INTERNAL MEDICINE CLINIC | Age: 56
End: 2021-12-16
Payer: COMMERCIAL

## 2021-12-16 VITALS
HEIGHT: 70 IN | SYSTOLIC BLOOD PRESSURE: 134 MMHG | BODY MASS INDEX: 19.53 KG/M2 | DIASTOLIC BLOOD PRESSURE: 80 MMHG | OXYGEN SATURATION: 98 % | WEIGHT: 136.4 LBS | HEART RATE: 91 BPM

## 2021-12-16 DIAGNOSIS — Z71.6 TOBACCO ABUSE COUNSELING: Primary | ICD-10-CM

## 2021-12-16 DIAGNOSIS — I10 PRIMARY HYPERTENSION: ICD-10-CM

## 2021-12-16 PROCEDURE — 99213 OFFICE O/P EST LOW 20 MIN: CPT | Performed by: NURSE PRACTITIONER

## 2021-12-16 ASSESSMENT — ENCOUNTER SYMPTOMS
RHINORRHEA: 0
VOMITING: 0
SHORTNESS OF BREATH: 0
SINUS PAIN: 0
CONSTIPATION: 0
BLOOD IN STOOL: 0
TROUBLE SWALLOWING: 0
DIARRHEA: 0
NAUSEA: 0
COUGH: 0
CHEST TIGHTNESS: 0
SORE THROAT: 0
WHEEZING: 0
ABDOMINAL PAIN: 0

## 2021-12-16 NOTE — PROGRESS NOTES
Visit Information    Have you changed or started any medications since your last visit including any over-the-counter medicines, vitamins, or herbal medicines? no   Are you having any side effects from any of your medications? -  no  Have you stopped taking any of your medications? Is so, why? -  no    Have you seen any other physician or provider since your last visit? No  Have you had any other diagnostic tests since your last visit? No  Have you been seen in the emergency room and/or had an admission to a hospital since we last saw you? No  Have you had your routine dental cleaning in the past 6 months? no    Have you activated your [employer] account? If not, what are your barriers?  Yes     Patient Care Team:  MARC Zapata CNP as PCP - General (Nurse Practitioner)  MARC Zapata CNP as PCP - [facility]  Vic Padilla MD as Consulting Physician (Vascular Surgery)  Sandra Hayward RN as Imaging Navigator    Medical History Review  Past Medical, Family, and Social History reviewed and does contribute to the patient presenting condition    Health Maintenance   Topic Date Due    COVID-19 Vaccine (1) Never done    DTaP/Tdap/Td vaccine (1 - Tdap) Never done    Shingles Vaccine (1 of 2) Never done    Flu vaccine (1) Never done    Lipid screen  02/08/2022    Potassium monitoring  02/08/2022    Creatinine monitoring  02/08/2022    Low dose CT lung screening  05/12/2022    Colon cancer screen colonoscopy  08/18/2022    Pneumococcal 0-64 years Vaccine (2 of 2 - PPSV23) 04/30/2030    Hepatitis C screen  Completed    HIV screen  Completed    Hepatitis A vaccine  Aged Out    Hepatitis B vaccine  Aged Out    Hib vaccine  Aged Out    Meningococcal (ACWY) vaccine  Aged Out         [address] [address]  Dept: [phone]  Dept Fax: [phone]    OfficeProgress/Follow Up Note  Date of patient's visit: 12/16/2021  Patient's Name:  Cyndi Ribeiro YOB: 1965            Patient Care Team:  MARC Perez CNP as PCP - General (Nurse Practitioner)  MARC Perez CNP as PCP - [facility] Provider  Lisa Ordoñez MD as Consulting Physician (Vascular Surgery)  Anni Mckeon, RN as Imaging Navigator    REASON FOR VISIT:  Routine outpatient follow up    HISTORY OF PRESENT ILLNESS:        History was obtained from the patient. Cyndi Ribeiro is a 64 y.o. male here today for Hypertension    Smokes 3-5 cigars per day. Has not started wellbutrin at this time. States he is planning to start medication when he is off of work in a couple of weeks. No complaints at this time.      Patient Active Problem List   Diagnosis    Hypertension    Peripheral vascular disease ([facility] [address]    Hypokalemia    History of endarterectomy    Hyperlipidemia LDL goal <70    Atherosclerosis of native artery of extremity with intermittent claudication (HCC)    Chronic mesenteric ischemia (HCC)    SOB (shortness of breath) on exertion    History of cardiovascular stress test    Tobacco abuse counseling       Health Maintenance Due   Topic Date Due    COVID-19 Vaccine (1) Never done    DTaP/Tdap/Td vaccine (1 - Tdap) Never done    Shingles Vaccine (1 of 2) Never done    Flu vaccine (1) Never done       MEDICATIONS:      Current Outpatient Medications   Medication Sig Dispense Refill    vitamin D3 (CHOLECALCIFEROL) 25 MCG (1000 UT) TABS tablet Take 1 tablet by mouth daily 90 tablet 1    clopidogrel (PLAVIX) 75 MG tablet Take 1 tablet by mouth daily 90 tablet 3    hydroCHLOROthiazide (HYDRODIURIL) 25 MG tablet TAKE 1 TABLET BY MOUTH EVERY DAY 90 tablet 3    fluticasone (FLONASE) 50 MCG/ACT nasal spray       atorvastatin (LIPITOR) 80 MG tablet Take 1 tablet by mouth daily 30 tablet 3    BABY ASPIRIN PO Take 81 mg by mouth      buPROPion (WELLBUTRIN SR) 150 MG extended release tablet TAKE 1 TABLET BY Patient voiced understanding. Patient given educational materials - see patient instructions    MARC Haas - CNP   [facility]  12/16/2021, 8:16 AM    Please note that this chart was generated using voice recognition Dragon dictation software. Although everyeffort was made to ensure the accuracy of this automated transcription, some errors in transcription may have occurred.

## 2022-01-05 DIAGNOSIS — I10 PRIMARY HYPERTENSION: Primary | ICD-10-CM

## 2022-01-17 RX ORDER — HYDROCHLOROTHIAZIDE 25 MG/1
TABLET ORAL
Qty: 90 TABLET | Refills: 3 | Status: SHIPPED | OUTPATIENT
Start: 2022-01-17

## 2022-02-02 DIAGNOSIS — K55.1 CHRONIC MESENTERIC ISCHEMIA (HCC): ICD-10-CM

## 2022-02-02 DIAGNOSIS — I73.9 PERIPHERAL VASCULAR DISEASE (HCC): ICD-10-CM

## 2022-02-02 RX ORDER — CLOPIDOGREL BISULFATE 75 MG/1
TABLET ORAL
Qty: 90 TABLET | Refills: 3 | Status: SHIPPED | OUTPATIENT
Start: 2022-02-02

## 2022-04-04 DIAGNOSIS — Z72.0 TOBACCO ABUSE: ICD-10-CM

## 2022-04-04 RX ORDER — BUPROPION HYDROCHLORIDE 150 MG/1
TABLET, EXTENDED RELEASE ORAL
Qty: 60 TABLET | Refills: 3 | OUTPATIENT
Start: 2022-04-04

## 2022-04-19 ENCOUNTER — TELEPHONE (OUTPATIENT)
Dept: ONCOLOGY | Age: 57
End: 2022-04-19

## 2022-04-19 DIAGNOSIS — Z87.891 PERSONAL HISTORY OF NICOTINE DEPENDENCE: Primary | ICD-10-CM

## 2022-04-19 NOTE — TELEPHONE ENCOUNTER
Our records indicate that your patient is coming due for their annual lung cancer screening follow up testing. For your convenience, we have pended the order for the scan for you. If you do not agree with the need for the test, please cancel the order and let us know. Sincerely,    [address] [place] [address] Screening Program    Auto printed reminder letter sent to patient.

## 2022-04-21 ENCOUNTER — TELEMEDICINE (OUTPATIENT)
Dept: INTERNAL MEDICINE CLINIC | Age: 57
End: 2022-04-21
Payer: COMMERCIAL

## 2022-04-21 ENCOUNTER — HOSPITAL ENCOUNTER (OUTPATIENT)
Dept: GENERAL RADIOLOGY | Facility: CLINIC | Age: 57
Discharge: HOME | End: 2022-04-23
Payer: COMMERCIAL

## 2022-04-21 ENCOUNTER — HOSPITAL ENCOUNTER (OUTPATIENT)
Facility: CLINIC | Age: 57
Discharge: HOME | End: 2022-04-23
Payer: COMMERCIAL

## 2022-04-21 ENCOUNTER — NURSE TRIAGE (OUTPATIENT)
Dept: OTHER | Facility: CLINIC | Age: 57
End: 2022-04-21

## 2022-04-21 DIAGNOSIS — R05.8 PRODUCTIVE COUGH: Primary | ICD-10-CM

## 2022-04-21 DIAGNOSIS — R05.8 PRODUCTIVE COUGH: ICD-10-CM

## 2022-04-21 PROCEDURE — 71046 X-RAY EXAM CHEST 2 VIEWS: CPT

## 2022-04-21 PROCEDURE — 99213 OFFICE O/P EST LOW 20 MIN: CPT | Performed by: NURSE PRACTITIONER

## 2022-04-21 RX ORDER — LEVOFLOXACIN 750 MG/1
750 TABLET ORAL DAILY
Qty: 5 TABLET | Refills: 0 | Status: SHIPPED | OUTPATIENT
Start: 2022-04-21 | End: 2022-04-26

## 2022-04-21 ASSESSMENT — ENCOUNTER SYMPTOMS
SINUS PAIN: 0
SHORTNESS OF BREATH: 1
CHEST TIGHTNESS: 0
ABDOMINAL PAIN: 0
VOMITING: 0
TROUBLE SWALLOWING: 0
SORE THROAT: 0
BLOOD IN STOOL: 0
RHINORRHEA: 1
NAUSEA: 0
DIARRHEA: 0
CONSTIPATION: 0
COUGH: 1
SINUS PRESSURE: 1
WHEEZING: 0

## 2022-04-21 NOTE — TELEPHONE ENCOUNTER
Received call from Tam Amezquita at [facility] with [facility]. Subjective: Caller states \"I have a cough and am coughing up phlegm with blood. \"     Current Symptoms: productive cough with blood in phlegm. Intermittent cough, worse at night and first thing in the morning. Blood was about 1/2 of what was  Coughed up. He states whole amount was about a tsp. Has occurred 2 times that he's aware. Once he just swallowed what came up. Is currently taking 2 blood thinners. Stuffy nose, not runny    Denies more SOB than normal. Chest pain, dizziness, weakness, numbness/tingling, sore throat, ear pain, headache. Onset: 1 day ago;     Associated Symptoms: NA    Pain Severity: 0/10; Temperature: none     What has been tried: nothing    LMP: NA Pregnant: NA    Recommended disposition: See PCP within 3 Days    Care advice provided, patient verbalizes understanding; denies any other questions or concerns; instructed to call back for any new or worsening symptoms. Patient/Caller agrees with recommended disposition; writer provided warm transfer to Sandhya Wong at [facility] for appointment scheduling     Attention Provider: Thank you for allowing me to participate in the care of your patient. The patient was connected to triage in response to information provided to the ECC/PSC. Please do not respond through this encounter as the response is not directed to a shared pool.           Reason for Disposition   [1] Coughed up blood-tinged sputum AND [2] more than once    Protocols used: COUGH - ACUTE PRODUCTIVE-ADULT-AH

## 2022-04-21 NOTE — PROGRESS NOTES
Visit Information    Have you changed or started any medications since your last visit including any over-the-counter medicines, vitamins, or herbal medicines? no   Are you having any side effects from any of your medications? -  no  Have you stopped taking any of your medications? Is so, why? -  no    Have you seen any other physician or provider since your last visit? Yes - Records Obtained  Have you had any other diagnostic tests since your last visit? Yes - Records Obtained  Have you been seen in the emergency room and/or had an admission to a hospital since we last saw you? No  Have you had your routine dental cleaning in the past 6 months? no    Have you activated your [employer] account? If not, what are your barriers?  Yes     Patient Care Team:  MARC Kline CNP as PCP - General (Nurse Practitioner)  MARC Kline CNP as PCP - [facility] Provider  Juan Beverly MD as Consulting Physician (Vascular Surgery)  Ronda Giordano RN as Imaging Navigator    Medical History Review  Past Medical, Family, and Social History reviewed and does not contribute to the patient presenting condition    Health Maintenance   Topic Date Due    COVID-19 Vaccine (1) Never done    DTaP/Tdap/Td vaccine (1 - Tdap) Never done    Shingles Vaccine (1 of 2) Never done    Pneumococcal 0-64 years Vaccine (2 - PCV) 07/08/2021    Lipids  02/08/2022    Depression Screen  05/03/2022    Low dose CT lung screening  05/12/2022    Colorectal Cancer Screen  08/18/2022    Flu vaccine (Season Ended) 09/01/2022    Potassium  10/01/2022    Creatinine  10/01/2022    Hepatitis C screen  Completed    HIV screen  Completed    Hepatitis A vaccine  Aged Out    Hepatitis B vaccine  Aged Out    Hib vaccine  Aged Out    Meningococcal (ACWY) vaccine  Aged Out         [address] [address]  Dept: [phone]  Dept Fax: [phone]    Virtual Visit Progress Note  Date of patient's visit: 4/21/2022  Patient's Name:  Kaykay Dawson YOB: 1965            Patient Care Team:  MARC Raza CNP as PCP - General (Nurse Practitioner)  MARC Raza CNP as PCP - [facility]  Vahid Asher MD as Consulting Physician (Vascular Surgery)  Freeman Adame RN as Imaging Navigator    REASON FOR VISIT:  Same day visit    HISTORY OF PRESENT ILLNESS:      Chief Complaint   Patient presents with    Cough     phlegm with blood, hx of pneumonia        History was obtained from the patient. Kaykay Dawson is a 64 y.o. male here today virtually for productive cough started yesterday deep red, alan colored sputum. Sinus congestion, sinus pressure, intermittent SOB, denies difficulty breathing. Denies fevers, chills, denies sick contacts at home. Patient had similar symptoms last year and was diagnosed with pneumonia. Treated with Levaquin.        Patient Active Problem List   Diagnosis    Hypertension    Peripheral vascular disease ([facility] [address]    Hypokalemia    History of endarterectomy    Hyperlipidemia LDL goal <70    Atherosclerosis of native artery of extremity with intermittent claudication (HCC)    Chronic mesenteric ischemia (HCC)    SOB (shortness of breath) on exertion    History of cardiovascular stress test    Tobacco abuse counseling       MEDICATIONS:      Current Outpatient Medications   Medication Sig Dispense Refill    levoFLOXacin (LEVAQUIN) 750 MG tablet Take 1 tablet by mouth daily for 5 days 5 tablet 0    clopidogrel (PLAVIX) 75 MG tablet TAKE 1 TABLET BY MOUTH EVERY DAY 90 tablet 3    hydroCHLOROthiazide (HYDRODIURIL) 25 MG tablet TAKE 1 TABLET BY MOUTH EVERY DAY 90 tablet 3    vitamin D3 (CHOLECALCIFEROL) 25 MCG (1000 UT) TABS tablet Take 1 tablet by mouth daily 90 tablet 1    atorvastatin (LIPITOR) 80 MG tablet Take 1 tablet by mouth daily 30 tablet 3    BABY ASPIRIN PO Take 81 mg by mouth      buPROPion (WELLBUTRIN SR) 150 MG extended release tablet TAKE 1 TABLET BY MOUTH DAILY FOR FIRST THREE DAYS THEN TAKE 1 TABLET TWICE DAILY (Patient not taking: Reported on 12/16/2021) 60 tablet 3    fluticasone (FLONASE) 50 MCG/ACT nasal spray  (Patient not taking: Reported on 2/7/2022)       No current facility-administered medications for this visit. ALLERGIES:    No Known Allergies    SOCIAL HISTORY   Reviewed and no change from previous record. Carmencita Vu  reports that he has been smoking cigarettes. He has a 30.00 pack-year smoking history. He has never used smokeless tobacco.    FAMILY HISTORY:    Reviewed and No change from previous visit    REVIEW OF SYSTEMS:    Review of Systems   Constitutional: Negative for appetite change, chills, diaphoresis, fatigue, fever and unexpected weight change. HENT: Positive for congestion, postnasal drip, rhinorrhea and sinus pressure. Negative for ear pain, sinus pain, sore throat and trouble swallowing. Eyes: Negative for visual disturbance. Respiratory: Positive for cough and shortness of breath. Negative for chest tightness and wheezing. Cardiovascular: Negative for chest pain, palpitations and leg swelling. Gastrointestinal: Negative for abdominal pain, blood in stool, constipation, diarrhea, nausea and vomiting. Genitourinary: Negative for difficulty urinating, dysuria and flank pain. Musculoskeletal: Negative for arthralgias and myalgias. Skin: Negative for rash and wound. Neurological: Negative for dizziness, syncope and weakness. All other systems reviewed and are negative. PHYSICAL EXAM:    There were no vitals filed for this visit. Exam was limited due to virtual encounter.     General - alert, well appearing, non toxic, in no distress  Skin - normal coloration and turgor, no rash  Eyes - sclera appear clear, no conjunctival injection, no discharge  Ears - no pain with manipulation of tragus or auricle, no drainage, no mastoid tenderness, hearing normal   Nose - normal and patent, no erythema, discharge  Mouth - mucous membranes are moist  Neck - supple, no masses appreciated  Chest - respirations are unlabored, no tachypnea or signs or respiratory distress  Abdomen - soft, nontender, nondistended  Neurological - alert, oriented x 3, normal speech  Extremities - no pedal edema    ASSESSMENT AND PLAN:      1. Productive cough  - levoFLOXacin (LEVAQUIN) 750 MG tablet; Take 1 tablet by mouth daily for 5 days  Dispense: 5 tablet; Refill: 0  - XR CHEST STANDARD (2 VW); Future      FOLLOW UP AND INSTRUCTIONS:   No follow-ups on file. Discussed use, benefit, and side effects of prescribed medications. All patient questions answered. Patient voiced understanding. Patient given educational materials - see patient instructions    Patient being evaluated by a Virtual Visit (video visit) encounter to address concerns as mentioned above. A caregiver was present when appropriate. Due to this being a TeleHealth encounter (During LAKKD-[facility] public [facility] emergency), evaluation of the following organ systems was limited: Vitals/Constitutional/EENT/Resp/CV/GI//MS/Neuro/Skin/Heme-Lymph-Imm. Pursuant to the emergency declaration under the [address] [place] [address], [address] [place] [address] authority and the [employer] and [employer] Act, this Virtual Visit was conducted with patient's (and/or legal guardian's) consent, to reduce the patient's risk of exposure to COVID-19 and provide necessary medical care. The patient (and/or legal guardian) has also been advised to contact this office for worsening conditions or problems, and seek emergency medical treatment and/or call 911 if deemed necessary. Services were provided through a video synchronous discussion virtually to substitute for in-person clinic visit. Patient and provider were located at their individual homes.     MARC Donald CNP [facility]  4/21/2022, 1:00 PM    Please note that this chart wasgenerated using voice recognition Dragon dictation software. Although every effort was made to ensure the accuracy of this automated transcription, some errors in transcription may have occurred.

## 2022-04-22 ENCOUNTER — TELEPHONE (OUTPATIENT)
Dept: INTERNAL MEDICINE CLINIC | Age: 57
End: 2022-04-22

## 2022-04-22 NOTE — TELEPHONE ENCOUNTER
Patient called stating his Chest Xray that was ordered by Rosa Herzog came back negative.  He was wondering how she would like to proceed with existing condition (cough)    Please call back Angel Gross [phone]

## 2022-10-05 ENCOUNTER — OFFICE VISIT (OUTPATIENT)
Dept: INTERNAL MEDICINE CLINIC | Age: 57
End: 2022-10-05
Payer: COMMERCIAL

## 2022-10-05 VITALS
WEIGHT: 138 LBS | SYSTOLIC BLOOD PRESSURE: 148 MMHG | DIASTOLIC BLOOD PRESSURE: 72 MMHG | OXYGEN SATURATION: 95 % | BODY MASS INDEX: 19.8 KG/M2 | HEART RATE: 84 BPM

## 2022-10-05 DIAGNOSIS — Z13.220 SCREENING FOR HYPERLIPIDEMIA: ICD-10-CM

## 2022-10-05 DIAGNOSIS — Z12.11 COLON CANCER SCREENING: ICD-10-CM

## 2022-10-05 DIAGNOSIS — R04.2 HEMOPTYSIS: Primary | ICD-10-CM

## 2022-10-05 PROCEDURE — 99203 OFFICE O/P NEW LOW 30 MIN: CPT | Performed by: INTERNAL MEDICINE

## 2022-10-05 RX ORDER — AZITHROMYCIN 250 MG/1
TABLET, FILM COATED ORAL
Qty: 1 PACKET | Refills: 0 | Status: SHIPPED | OUTPATIENT
Start: 2022-10-05

## 2022-10-05 ASSESSMENT — PATIENT HEALTH QUESTIONNAIRE - PHQ9
SUM OF ALL RESPONSES TO PHQ QUESTIONS 1-9: 0
SUM OF ALL RESPONSES TO PHQ QUESTIONS 1-9: 0
1. LITTLE INTEREST OR PLEASURE IN DOING THINGS: 0
SUM OF ALL RESPONSES TO PHQ QUESTIONS 1-9: 0
SUM OF ALL RESPONSES TO PHQ QUESTIONS 1-9: 0

## 2022-10-05 ASSESSMENT — ENCOUNTER SYMPTOMS
COUGH: 1
SHORTNESS OF BREATH: 0

## 2022-10-05 NOTE — PROGRESS NOTES
[address] [facility] [address]  [place] [address]  Dept: [phone]  Dept Fax: [phone]    Radha Conde is a 62 y.o. male who presents today for his medicalconditions/complaints as noted below. Radha Conde is c/o of Annual Exam (New pt) and Cough (With phlegm and blood)      HPI:     Cough  This is a new problem. The current episode started yesterday. The cough is Productive of bloody sputum (streaks of blood). Pertinent negatives include no chest pain or shortness of breath. Risk factors for lung disease include smoking/tobacco exposure. He has tried nothing for the symptoms. The treatment provided no relief. His past medical history is significant for COPD. Past Medical History:   Diagnosis Date    Chronic mesenteric ischemia ([facility])     Environmental allergies     PAD (peripheral artery disease) ([facility])     PVD (peripheral vascular disease) ([facility])         Current Outpatient Medications   Medication Sig Dispense Refill    azithromycin (ZITHROMAX) 250 MG tablet Take 2 tabs (500 mg) on Day 1, and take 1 tab (250 mg) on days 2 through 5. 1 packet 0    clopidogrel (PLAVIX) 75 MG tablet TAKE 1 TABLET BY MOUTH EVERY DAY 90 tablet 3    hydroCHLOROthiazide (HYDRODIURIL) 25 MG tablet TAKE 1 TABLET BY MOUTH EVERY DAY 90 tablet 3    vitamin D3 (CHOLECALCIFEROL) 25 MCG (1000 UT) TABS tablet Take 1 tablet by mouth daily 90 tablet 1    atorvastatin (LIPITOR) 80 MG tablet Take 1 tablet by mouth daily 30 tablet 3    BABY ASPIRIN PO Take 81 mg by mouth      buPROPion (WELLBUTRIN SR) 150 MG extended release tablet TAKE 1 TABLET BY MOUTH DAILY FOR FIRST THREE DAYS THEN TAKE 1 TABLET TWICE DAILY (Patient not taking: No sig reported) 60 tablet 3    fluticasone (FLONASE) 50 MCG/ACT nasal spray  (Patient not taking: No sig reported)       No current facility-administered medications for this visit.      No Known Allergies    Health Maintenance   Topic Date Due    COVID-19 Vaccine (1) Never done DTaP/Tdap/Td vaccine (1 - Tdap) Never done    Shingles vaccine (1 of 2) Never done    Lipids  02/08/2022    Depression Screen  05/03/2022    Low dose CT lung screening  05/12/2022    Flu vaccine (1) Never done    Colorectal Cancer Screen  08/18/2022    Pneumococcal 0-64 years Vaccine  Completed    Hepatitis C screen  Completed    HIV screen  Completed    Hepatitis A vaccine  Aged Out    Hepatitis B vaccine  Aged Out    Hib vaccine  Aged Out    Meningococcal (ACWY) vaccine  Aged Out       Subjective:      Review of Systems   Respiratory:  Positive for cough. Negative for shortness of breath. Cardiovascular:  Negative for chest pain. All other systems reviewed and are negative. Objective:     Physical Exam  Vitals reviewed. Constitutional:       Appearance: He is well-developed. HENT:      Head: Normocephalic and atraumatic. Eyes:      Conjunctiva/sclera: Conjunctivae normal.      Pupils: Pupils are equal, round, and reactive to light. Neck:      Thyroid: No thyromegaly. Vascular: No JVD. Cardiovascular:      Rate and Rhythm: Normal rate and regular rhythm. Heart sounds: Normal heart sounds. No murmur heard. Pulmonary:      Effort: Pulmonary effort is normal.      Breath sounds: Decreased breath sounds present. Abdominal:      General: Bowel sounds are normal.      Palpations: Abdomen is soft. Musculoskeletal:         General: Normal range of motion. Cervical back: Normal range of motion and neck supple. Skin:     General: Skin is warm and dry. Neurological:      Mental Status: He is alert and oriented to person, place, and time. Deep Tendon Reflexes: Reflexes are normal and symmetric. BP (!) 148/72 (Site: Right Upper Arm, Position: Sitting)   Pulse 84   Wt 138 lb (62.6 kg)   SpO2 95%   BMI 19.80 kg/m²       Assessment:       Diagnosis Orders   1. Hemoptysis  XR CHEST (2 VW)    azithromycin (ZITHROMAX) 250 MG tablet      2.  Screening for hyperlipidemia  Lipid Panel      3. Colon cancer screening  FIT-DNA (Cologuard)          Plan:      No follow-ups on file. Orders Placed This Encounter   Medications    azithromycin (ZITHROMAX) 250 MG tablet     Sig: Take 2 tabs (500 mg) on Day 1, and take 1 tab (250 mg) on days 2 through 5. Dispense:  1 packet     Refill:  0     Orders Placed This Encounter   Procedures    FIT-DNA (Cologuard)    XR CHEST (2 VW)     Standing Status:   Future     Standing Expiration Date:   10/5/2023    Lipid Panel     Standing Status:   Future     Standing Expiration Date:   10/5/2023     Order Specific Question:   Is Patient Fasting?/# of Hours     Answer:   No            Patient given educational materials - see patient instructions. Discussed use, benefit, and side effects of prescribed medications. All patientquestions answered. Pt voiced understanding.     Electronically signed by Luda Villalta MD on 10/5/2022at 1:55 PM

## 2022-10-06 ENCOUNTER — HOSPITAL ENCOUNTER (OUTPATIENT)
Facility: CLINIC | Age: 57
Discharge: HOME | End: 2022-10-08
Payer: COMMERCIAL

## 2022-10-06 ENCOUNTER — HOSPITAL ENCOUNTER (OUTPATIENT)
Age: 57
Setting detail: SPECIMEN
Discharge: HOME | End: 2022-10-06

## 2022-10-06 ENCOUNTER — HOSPITAL ENCOUNTER (OUTPATIENT)
Dept: GENERAL RADIOLOGY | Facility: CLINIC | Age: 57
Discharge: HOME | End: 2022-10-08
Payer: COMMERCIAL

## 2022-10-06 DIAGNOSIS — Z13.220 SCREENING FOR HYPERLIPIDEMIA: ICD-10-CM

## 2022-10-06 DIAGNOSIS — R04.2 HEMOPTYSIS: ICD-10-CM

## 2022-10-06 PROCEDURE — 71046 X-RAY EXAM CHEST 2 VIEWS: CPT

## 2022-10-07 LAB
CHOLESTEROL/HDL RATIO: 2.6
CHOLESTEROL: 140 MG/DL
HDLC SERPL-MCNC: 54 MG/DL
LDL CHOLESTEROL: 69 MG/DL (ref 0–130)
TRIGL SERPL-MCNC: 85 MG/DL

## 2022-11-09 DIAGNOSIS — I10 PRIMARY HYPERTENSION: ICD-10-CM

## 2022-11-09 RX ORDER — HYDROCHLOROTHIAZIDE 25 MG/1
TABLET ORAL
Qty: 30 TABLET | Refills: 5 | Status: SHIPPED | OUTPATIENT
Start: 2022-11-09

## 2023-02-13 DIAGNOSIS — I73.9 PERIPHERAL VASCULAR DISEASE (HCC): ICD-10-CM

## 2023-02-13 DIAGNOSIS — K55.1 CHRONIC MESENTERIC ISCHEMIA (HCC): ICD-10-CM

## 2023-02-13 RX ORDER — CLOPIDOGREL BISULFATE 75 MG/1
75 TABLET ORAL DAILY
Qty: 90 TABLET | Refills: 1 | Status: SHIPPED | OUTPATIENT
Start: 2023-02-13

## 2023-05-20 DIAGNOSIS — I10 PRIMARY HYPERTENSION: ICD-10-CM

## 2023-05-22 RX ORDER — HYDROCHLOROTHIAZIDE 25 MG/1
TABLET ORAL
Qty: 30 TABLET | Refills: 5 | Status: SHIPPED | OUTPATIENT
Start: 2023-05-22

## 2023-09-18 ENCOUNTER — TELEPHONE (OUTPATIENT)
Dept: INTERNAL MEDICINE CLINIC | Age: 58
End: 2023-09-18

## 2023-09-18 DIAGNOSIS — K55.1 CHRONIC MESENTERIC ISCHEMIA (HCC): ICD-10-CM

## 2023-09-18 DIAGNOSIS — I73.9 PERIPHERAL VASCULAR DISEASE (HCC): ICD-10-CM

## 2023-09-18 DIAGNOSIS — I10 PRIMARY HYPERTENSION: ICD-10-CM

## 2023-09-18 RX ORDER — CLOPIDOGREL BISULFATE 75 MG/1
75 TABLET ORAL DAILY
Qty: 90 TABLET | Refills: 1 | Status: SHIPPED | OUTPATIENT
Start: 2023-09-18

## 2023-09-18 RX ORDER — HYDROCHLOROTHIAZIDE 25 MG/1
25 TABLET ORAL DAILY
Qty: 30 TABLET | Refills: 5 | Status: SHIPPED | OUTPATIENT
Start: 2023-09-18

## 2023-09-18 NOTE — TELEPHONE ENCOUNTER
[facility] 10/05/2022    Next apt is 10/25/2023    Pt is in between insurance right now, after just retiring. He said he should have some by next month. Pt cant afford to pay out of pocket and wanted to know if medications could be refilled. Tried to pend mediations but computer wont allow me to. Medications needed     Hydrochlorothiazide  Clopidogrel     Please send to [facility] if called in.

## 2023-10-24 SDOH — HEALTH STABILITY: PHYSICAL HEALTH: ON AVERAGE, HOW MANY MINUTES DO YOU ENGAGE IN EXERCISE AT THIS LEVEL?: 20 MIN

## 2023-10-24 SDOH — HEALTH STABILITY: PHYSICAL HEALTH: ON AVERAGE, HOW MANY DAYS PER WEEK DO YOU ENGAGE IN MODERATE TO STRENUOUS EXERCISE (LIKE A BRISK WALK)?: 3 DAYS

## 2023-10-24 ASSESSMENT — SOCIAL DETERMINANTS OF HEALTH (SDOH)
WITHIN THE LAST YEAR, HAVE YOU BEEN AFRAID OF YOUR PARTNER OR EX-PARTNER?: NO
WITHIN THE LAST YEAR, HAVE YOU BEEN KICKED, HIT, SLAPPED, OR OTHERWISE PHYSICALLY HURT BY YOUR PARTNER OR EX-PARTNER?: NO
WITHIN THE LAST YEAR, HAVE YOU BEEN HUMILIATED OR EMOTIONALLY ABUSED IN OTHER WAYS BY YOUR PARTNER OR EX-PARTNER?: NO
WITHIN THE LAST YEAR, HAVE TO BEEN RAPED OR FORCED TO HAVE ANY KIND OF SEXUAL ACTIVITY BY YOUR PARTNER OR EX-PARTNER?: NO

## 2023-10-25 ENCOUNTER — OFFICE VISIT (OUTPATIENT)
Dept: INTERNAL MEDICINE CLINIC | Age: 58
End: 2023-10-25

## 2023-10-25 VITALS
DIASTOLIC BLOOD PRESSURE: 80 MMHG | BODY MASS INDEX: 19.8 KG/M2 | WEIGHT: 138 LBS | OXYGEN SATURATION: 95 % | HEART RATE: 96 BPM | SYSTOLIC BLOOD PRESSURE: 130 MMHG

## 2023-10-25 DIAGNOSIS — Z13.9 SCREENING DUE: ICD-10-CM

## 2023-10-25 DIAGNOSIS — I73.9 PERIPHERAL VASCULAR DISEASE (HCC): ICD-10-CM

## 2023-10-25 DIAGNOSIS — E55.9 VITAMIN D DEFICIENCY: ICD-10-CM

## 2023-10-25 DIAGNOSIS — D75.1 POLYCYTHEMIA: ICD-10-CM

## 2023-10-25 DIAGNOSIS — E78.5 HYPERLIPIDEMIA, UNSPECIFIED HYPERLIPIDEMIA TYPE: ICD-10-CM

## 2023-10-25 DIAGNOSIS — Z12.11 SCREEN FOR COLON CANCER: ICD-10-CM

## 2023-10-25 DIAGNOSIS — Z87.891 PERSONAL HISTORY OF TOBACCO USE: Primary | ICD-10-CM

## 2023-10-25 PROCEDURE — 3079F DIAST BP 80-89 MM HG: CPT | Performed by: INTERNAL MEDICINE

## 2023-10-25 PROCEDURE — 99214 OFFICE O/P EST MOD 30 MIN: CPT | Performed by: INTERNAL MEDICINE

## 2023-10-25 PROCEDURE — 3075F SYST BP GE 130 - 139MM HG: CPT | Performed by: INTERNAL MEDICINE

## 2023-10-25 PROCEDURE — G0296 VISIT TO DETERM LDCT ELIG: HCPCS | Performed by: INTERNAL MEDICINE

## 2023-10-25 SDOH — ECONOMIC STABILITY: FOOD INSECURITY: WITHIN THE PAST 12 MONTHS, THE FOOD YOU BOUGHT JUST DIDN'T LAST AND YOU DIDN'T HAVE MONEY TO GET MORE.: NEVER TRUE

## 2023-10-25 SDOH — ECONOMIC STABILITY: INCOME INSECURITY: HOW HARD IS IT FOR YOU TO PAY FOR THE VERY BASICS LIKE FOOD, HOUSING, MEDICAL CARE, AND HEATING?: NOT HARD AT ALL

## 2023-10-25 SDOH — ECONOMIC STABILITY: HOUSING INSECURITY
IN THE LAST 12 MONTHS, WAS THERE A TIME WHEN YOU DID NOT HAVE A STEADY PLACE TO SLEEP OR SLEPT IN A SHELTER (INCLUDING NOW)?: NO

## 2023-10-25 SDOH — ECONOMIC STABILITY: FOOD INSECURITY: WITHIN THE PAST 12 MONTHS, YOU WORRIED THAT YOUR FOOD WOULD RUN OUT BEFORE YOU GOT MONEY TO BUY MORE.: NEVER TRUE

## 2023-10-25 ASSESSMENT — PATIENT HEALTH QUESTIONNAIRE - PHQ9
SUM OF ALL RESPONSES TO PHQ QUESTIONS 1-9: 0
SUM OF ALL RESPONSES TO PHQ QUESTIONS 1-9: 0
1. LITTLE INTEREST OR PLEASURE IN DOING THINGS: 0
SUM OF ALL RESPONSES TO PHQ9 QUESTIONS 1 & 2: 0
2. FEELING DOWN, DEPRESSED OR HOPELESS: 0
SUM OF ALL RESPONSES TO PHQ QUESTIONS 1-9: 0
SUM OF ALL RESPONSES TO PHQ QUESTIONS 1-9: 0

## 2023-10-25 NOTE — PATIENT INSTRUCTIONS
out if you are at high risk for lung cancer. Your doctor can help you decide your lung cancer risk. What are the risks of screening? CT screening for lung cancer isn't perfect. It can show an abnormal result when it turns out there wasn't any cancer. This is called a false-positive result. This means you may need more tests to make sure you don't have cancer. These tests can be harmful and cause a lot of worry. These tests may include more CT scans and invasive testing like a lung biopsy. In a biopsy, the doctor takes a sample of tissue from inside your lung so it can be looked at under a microscope. A biopsy is the only way to tell if you have lung cancer. If the biopsy finds cancer, you and your doctor will have to decide how or whether to treat it. Some lung cancers found on CT scans are harmless and would not have caused a problem if they had not been found through screening. But because doctors can't tell which ones will turn out to be harmless, most will be treated. This means that you may get treatment--including surgery, radiation, or chemotherapy--that you don't need. There is a risk of damage to cells or tissue from being exposed to radiation, including the small amounts used in CTs, X-rays, and other medical tests. Over time, exposure to radiation may cause cancer and other health problems. But in most cases, the risk of getting cancer from being exposed to small amounts of radiation is low. It's not a reason to avoid these tests for most people. What are the benefits of screening? Your scan may be normal (negative). For some people who are at higher risk, screening lowers the chance of dying of lung cancer. How much and how long you smoked helps to determine your risk level. Screening can find some cancers early, when treatment may be more likely to work. What happens after screening? The results of your CT scan will be sent to your doctor.  Someone from your care team will explain the results
decreased balance during turns/decreased step length

## 2023-10-25 NOTE — PROGRESS NOTES
Discussed with the patient the current USPSTF guidelines released March 9, 2021 for screening for lung cancer. For adults aged 48 to 80 years who have a 20 pack-year smoking history and currently smoke or have quit within the past 15 years the grade B recommendation is to:  Screen for lung cancer with low-dose computed tomography (LDCT) every year. Stop screening once a person has not smoked for 15 years or has a health problem that limits life expectancy or the ability to have lung surgery. The patient  reports that he has been smoking cigarettes and cigars. He has a 30.00 pack-year smoking history. He has never used smokeless tobacco.. Discussed with patient the risks and benefits of screening, including over-diagnosis, false positive rate, and total radiation exposure. The patient currently exhibits no signs or symptoms suggestive of lung cancer. Discussed with patient the importance of compliance with yearly annual lung cancer screenings and willingness to undergo diagnosis and treatment if screening scan is positive. In addition, the patient was counseled regarding the importance of remaining smoke free and/or total smoking cessation.     Also reviewed the following if the patient has Medicare that as of February 10, 2022, Medicare only covers LDCT screening in patients aged 53-69 with at least a 20 pack-year smoking history who currently smoke or have quit in the last 15 years
Visit Information    Have you changed or started any medications since your last visit including any over-the-counter medicines, vitamins, or herbal medicines? no   Are you having any side effects from any of your medications? -  no  Have you stopped taking any of your medications? Is so, why? -  no    Have you seen any other physician or provider since your last visit? No  Have you had any other diagnostic tests since your last visit? No  Have you been seen in the emergency room and/or had an admission to a hospital since we last saw you? No  Have you had your routine dental cleaning in the past 6 months? no    Have you activated your [employer] account? If not, what are your barriers?  Yes     Patient Care Team:  Ekaterina Bernal MD as PCP - General (Internal Medicine)  Ekaterina Bernal MD as PCP - Empaneled Provider  Mikey Garcia MD as Consulting Physician (Vascular Surgery)    Medical History Review  Past Medical, Family, and Social History reviewed and does contribute to the patient presenting condition    Health Maintenance   Topic Date Due    Hepatitis B vaccine (1 of 3 - 3-dose series) Never done    COVID-19 Vaccine (1) Never done    DTaP/Tdap/Td vaccine (1 - Tdap) Never done    Shingles vaccine (1 of 2) Never done    Pneumococcal 0-64 years Vaccine (2 - PCV) 07/08/2021    Colorectal Cancer Screen  08/18/2022    Flu vaccine (1) Never done    Depression Screen  10/05/2023    Lipids  10/06/2023    Hepatitis C screen  Completed    HIV screen  Completed    Hepatitis A vaccine  Aged Out    Hib vaccine  Aged Out    Meningococcal (ACWY) vaccine  Aged Out    Low dose CT lung screening &/or counseling  Discontinued    Prostate Specific Antigen (PSA) Screening or Monitoring  Discontinued
Peripheral vascular disease s/p 3 stent placements to the left lower extremity, sees vascular with last visit in March 2023. Details not known, patient on aspirin, Plavix, atorvastatin  Mesenteric ischemia s/p 2 stent placements. This was also done at outside hospital.  Denies any acute concerns  Continued tobacco use, not willing to quit at this time. Educated against use, informed about side effects  Alcohol dependence, continues to drink 2 cans of beer a day  Abdominal aortic aneurysm, requested we will readdress this at next visit. Screening for lung cancer, last CT scan was 2021. Have placed for a repeat CT scan, will obtain this once he has insurance. Currently denies any symptoms concerning for lung cancer  History of vitamin D deficiency, repeat vitamin D levels at next visit  On prior lab work was noted to have polycythemia, we will follow-up on repeat blood work. Patient for now has declined all vaccinations  Screening will request affect PNA, as respectfully declined the PSA/digital rectal exam for now. FOLLOW UP AND INSTRUCTIONS:   Return in about 6 months (around 4/25/2024), or if symptoms worsen or fail to improve. Cassandra Flores received counseling on the following healthy behaviors: nutrition, exercise, medication adherence, tobacco cessation, and decrease in alcohol consumption    Discussed use, benefit, and side effects of prescribed medications. Barriers to medication compliance addressed. All patient questions answered. Pt voiced understanding.      Patient given educational materials - see patient instructions    MD CLARK Lee [facility]  10/25/2023, 11:18 AM

## 2024-03-22 DIAGNOSIS — I73.9 PERIPHERAL VASCULAR DISEASE (HCC): ICD-10-CM

## 2024-03-22 DIAGNOSIS — K55.1 CHRONIC MESENTERIC ISCHEMIA (HCC): ICD-10-CM

## 2024-03-25 RX ORDER — CLOPIDOGREL BISULFATE 75 MG/1
75 TABLET ORAL DAILY
Qty: 90 TABLET | Refills: 1 | Status: SHIPPED | OUTPATIENT
Start: 2024-03-25